# Patient Record
Sex: MALE | Race: BLACK OR AFRICAN AMERICAN | Employment: FULL TIME | URBAN - METROPOLITAN AREA
[De-identification: names, ages, dates, MRNs, and addresses within clinical notes are randomized per-mention and may not be internally consistent; named-entity substitution may affect disease eponyms.]

---

## 2019-05-24 ENCOUNTER — HOSPITAL ENCOUNTER (OUTPATIENT)
Dept: PHYSICAL THERAPY | Age: 59
Discharge: HOME | End: 2019-05-24
Payer: COMMERCIAL

## 2019-05-24 PROCEDURE — 97110 THERAPEUTIC EXERCISES: CPT

## 2019-05-24 PROCEDURE — 97162 PT EVAL MOD COMPLEX 30 MIN: CPT

## 2019-05-24 PROCEDURE — 97140 MANUAL THERAPY 1/> REGIONS: CPT

## 2019-05-24 NOTE — PROGRESS NOTES
Alexey Pace  : 1960  Primary: America Montalvo  Secondary:  [address] at [address]  [phone]   [phone]      OUTPATIENT PHYSICAL THERAPY: Daily Treatment Note 2019  Visit Count:  1    ICD-10: Treatment Diagnosis: Pain in Joint, R shoulder [M25.511]; Incomplete Rotator Cuff Tear, Not Specified as Traumatic [M75.111]; Cervicalgia [M54.2]  Precautions/Allergies:   Patient has no known allergies. TREATMENT PLAN:  Effective Dates: 2019 TO 2019 (60 days). Frequency/Duration: 2 times a week for 60 Day(s) MEDICAL/REFERRING DIAGNOSIS:  Left shoulder pain [M25.512]   DATE OF ONSET: 3/1/19  REFERRING PHYSICIAN: Jordy Pedroza MD MD Orders: Evaluate and Treat  Return MD Appointment: 19     Pre-treatment Symptoms/Complaints:  R shoulder pain  Pain: Initial: Pain Intensity 1: 8 /10 Post Session:  6/10   Medications Last Reviewed:  2019  Updated Objective Findings:  See evaluation note from today  TREATMENT:     Therapeutic Exercise: (15 Minutes):  Exercises per grid below to improve mobility and strength. Required moderate verbal and manual cues to promote proper body alignment, promote proper body posture and promote proper body mechanics. Progressed resistance, range and repetitions as indicated. Date:  2019   Activity/Exercise Parameters   ER AAROM in supine 5 minutes   AAROM shoulder flexion in supine 5 minutes   Scapular retraction 3 x 20   Upper trapezius stretch 2 minutes               Manual Therapy (    Soft Tissue Mobilization Duration  Duration: 10 Minutes): Manual techniques to facilitate improved motion and decreased pain.  (Used abbreviations: MET - muscle energy technique; PNF - proprioceptive neuromuscular facilitation; NMR - neuromuscular re-education; a/p - anterior to posterior; p/a - posterior to anterior)   · Joint mobilization: R glenohumeral, inferior glide, grade II  · Soft tissue mobilization: R upper trapezius, levator scapulae, latissimus dorsi, infraspinatus  · Manual cervical traction      [employer] Portal  Treatment/Session Summary:    · Response to Treatment:  Pt. tolerates todays treatment with mild pain complaints during soft tissue mobilization, but improve pain free shoulder ROM post treatment. Pt. is initiated on pain free AAROM exercises for the R shoulder to promote improved mobiility and ROM at this time. .  · Communication/Consultation:  None today  · Equipment provided today:  None today  · Recommendations/Intent for next treatment session: Next visit will focus on pain relief; consider pain free R shoulder ROM and gradual progression of shoulder strengthening. .    Total Treatment Billable Duration:  35  Minutes evaluation, 15 minutes therapeutic exercise, 10 minutes manual therapy.     PT Patient Time In/Time Out  Time In: 0930  Time Out: [address] Leonel Grossman PT    Future Appointments   Date Time Provider Ced Hope   5/29/2019  2:30 PM ARY PHYSICIAL THERAPIST [facility] ARY

## 2019-05-24 NOTE — THERAPY EVALUATION
Yousif Singletary  : 1960  Primary: [employer]  Secondary:  [address] at [address]  [address]  [phone]   [phone]        OUTPATIENT PHYSICAL THERAPY:Initial Assessment 2019   ICD-10: Treatment Diagnosis: Pain in Joint, R shoulder [M25.511]; Incomplete Rotator Cuff Tear, Not Specified as Traumatic [M75.111]; Cervicalgia [M54.2]  Precautions/Allergies:   Patient has no known allergies. TREATMENT PLAN:  Effective Dates: 2019 TO 2019 (60 days). Frequency/Duration: 2 times a week for 60 Day(s) MEDICAL/REFERRING DIAGNOSIS:  Left shoulder pain [M25.512]   DATE OF ONSET: 3/1/19  REFERRING PHYSICIAN: Abhinav Galo MD MD Orders: Evaluate and Treat  Return MD Appointment: 19     INITIAL ASSESSMENT:  Mr. Morales presents with signs of a R shoulder rotator cuff tear. Pt. Presents with moderate R shoulder pain, ROM deficits, weakness, and muscular guarding in the R upper trapezius, pectoralis, and latissimus dorsi musculature. PT is unable to test external rotation lag sign and drop arm test secondary to pain at initial evaluation. Secondary complaints include neck pain with likely guarding and compensation due to chronicity of current condition. Pt. Will benefit from skilled PT to address pain, normalize ROM, and improve strength for work activities and ADL's. PROBLEM LIST (Impacting functional limitations):  1. Decreased Strength  2. Decreased ADL/Functional Activities  3. Increased Pain  4. Decreased Activity Tolerance  5. Decreased Flexibility/Joint Mobility  6. Decreased [place] with Home Exercise Program INTERVENTIONS PLANNED: (Treatment may consist of any combination of the following)  1. Cryotherapy  2. Home Exercise Program (HEP)  3. Manual Therapy  4. Neuromuscular Re-education/Strengthening  5. Range of Motion (ROM)  6. Therapeutic Activites  7.  Therapeutic Exercise/Strengthening     GOALS: (Goals have been discussed and agreed upon with patient.)  Discharge Goals: Time Frame: 6 weeks  1. Pt. Will demonstrate < 4/10 R shoulder pain to improve sleep at night. 2. Pt. Will demonstrate >160 degrees of R shoulder flexion to improve overhead activities at work. 3. Pt. Will demonstrate 5/5 shoulder flexion to improve work lifting activities. 4. Pt. Will score < 20% disability on the quick Disabilities of the Shoulder and Hand (DASH) questionnaire to demonstrate improved pain and function. OUTCOME MEASURE:   Tool Used: Disabilities of the Arm, Shoulder and Hand (DASH) Questionnaire - Quick Version  Score:  Initial: 29/55  Most Recent: X/55 (Date: -- )   Interpretation of Score: The DASH is designed to measure the activities of daily living in person's with upper extremity dysfunction or pain. Each section is scored on a 1-5 scale, 5 representing the greatest disability. The scores of each section are added together for a total score of 55. MEDICAL NECESSITY:   · Patient is expected to demonstrate progress in strength and range of motion to increase independence with work activities and ADL's.  REASON FOR SERVICES/OTHER COMMENTS:  · Patient will benefit from skilled PT to address impairments identified through evaluation and return to previous ADL and recreational capacity. Total Duration:  PT Patient Time In/Time Out  Time In: 0930  Time Out: 1030    Rehabilitation Potential For Stated Goals: Good  Regarding Dimas Hamilton's therapy, I certify that the treatment plan above will be carried out by a therapist or under their direction. Thank you for this referral,  Alise Magallanes, PT     Referring Physician Signature: Camilo Devine MD _______________________________ Date _____________     PAIN/SUBJECTIVE:   Initial: Pain Intensity 1: 8 /10 Post Session:  6/10   HISTORY:   History of Injury/Illness (Reason for Referral):  Pt.  Attends PT c/o R shoulder pain since March 1st after lifting a heavy table at work. Pt. Notes the pain was initially managed with injections and he was put in a sling for 1 month to address pain. Pt. Then consulted an orthopedic physician and MRI diagnosed large rotator cuff tear in the R shoulder. Per patient, moderate glenohumeral arthritis is also present and physician recommended a total shoulder replacement. Pt. Would prefer not to have total shoulder replacement and seeks alternative intervention at this time. Currently, pt. Is not able to raise arm without pain and is not able to work. Pt. Works maintenance at [employer], climbing ladders, and carrying various objects. Secondary complaints include right neck pain. Past Medical History/Comorbidities:   Mr. Tracey Garrison  has a past medical history of Drug addiction ([address] and Rotator cuff tear. He also has no past medical history of Aneurysm ([address] Arrhythmia, Arthritis, Asthma, Autoimmune disease ([address] CAD (coronary artery disease), Cancer ([address] Chronic kidney disease, Chronic pain, Coagulation defects, COPD, Diabetes ([address] GERD (gastroesophageal reflux disease), Heart failure ([address] Hypertension, Liver disease, Psychiatric disorder, PUD (peptic ulcer disease), Seizures ([address] Stroke ([address] Thromboembolus ([address] or Thyroid disease. Mr. Tracey Garrison  has a past surgical history that includes hx knee arthroscopy and pr upper arm/elbow surgery unlisted. L rotator cuff repair 2011  Social History/Living Environment:     lives in a single story home with family  Prior Level of Function/Work/Activity:  Functioned independently without limitations     Ambulatory/Rehab Services H2 Model Falls Risk Assessment   Risk Factors:       (1)  Gender [Male] Ability to Rise from Chair:       (0)  Ability to rise in a single movement   Falls Prevention Plan:       No modifications necessary   Total: (5 or greater = High Risk): 1   ©2010 [employer]. All Rights Reserved. [facility] Patent #[id number]. Federal Law prohibits the replication, distribution or use without written permission from [facility] Vitals (vitals.com) [facility]   Current Medications:     No current outpatient medications on file. Date Last Reviewed:  5/24/2019   Number of Personal Factors/Comorbidities that affect the Plan of Care: 0: LOW COMPLEXITY   EXAMINATION:    ROM:       RIGHT LEFT    Shoulder flexion  60 AROM (pain)  162    Shoulder external rotation  60 PROM (pain)  79    Shoulder internal rotation  15 PROM (pain)  35          Strength:       RIGHT  LEFT     Shoulder flexion  2+/5 (pain)  5/5    Shoulder ER  3+/5 (pain) 5/5     Shoulder IR 5/5 (pain) 5/5    Scapular retraction  5/5 5/5           Flexibility:       RIGHT LEFT    Pectoralis Major/Minor  limited limited     Latissimus Dorsi  -- --     Upper trapezius  --  --          Special Testing       RIGHT LEFT    IR lag sign (+)       ER lag sign Unable to test       Drop arm Unable to test       Spurlings (-) pain in shoulder blade   (-)          Joint Mobility:        RIGHT LEFT       --  --                    Body Structures Involved:  1. Joints  2. Muscles Body Functions Affected:  1. Sensory/Pain  2. Neuromusculoskeletal  3. Movement Related Activities and Participation Affected:  1. General Tasks and Demands  2. Mobility  3. Self Care  4.  Community, Social and [place]   Number of elements (examined above) that affect the Plan of Care: 3: MODERATE COMPLEXITY   CLINICAL PRESENTATION:   Presentation: Evolving clinical presentation with changing clinical characteristics: MODERATE COMPLEXITY   CLINICAL DECISION MAKING:   Use of outcome tool(s) and clinical judgement create a POC that gives a: Questionable prediction of patient's progress: MODERATE COMPLEXITY

## 2019-05-29 ENCOUNTER — HOSPITAL ENCOUNTER (OUTPATIENT)
Dept: PHYSICAL THERAPY | Age: 59
Discharge: HOME | End: 2019-05-29
Payer: COMMERCIAL

## 2019-05-29 PROCEDURE — 97014 ELECTRIC STIMULATION THERAPY: CPT

## 2019-05-29 PROCEDURE — 97140 MANUAL THERAPY 1/> REGIONS: CPT

## 2019-05-29 NOTE — PROGRESS NOTES
Alexey Pace  : 1960  Primary: [employer]  Secondary:  [address] at [address], [address]  [phone]   [phone]      OUTPATIENT PHYSICAL THERAPY: Daily Treatment Note 2019  Visit Count:  2    ICD-10: Treatment Diagnosis: Pain in Joint, R shoulder [M25.511]; Incomplete Rotator Cuff Tear, Not Specified as Traumatic [M75.111]; Cervicalgia [M54.2]  Precautions/Allergies:   Patient has no known allergies. TREATMENT PLAN:  Effective Dates: 2019 TO 2019 (60 days). Frequency/Duration: 2 times a week for 60 Day(s) MEDICAL/REFERRING DIAGNOSIS:  Left shoulder pain [M25.512]   DATE OF ONSET: 3/1/19  REFERRING PHYSICIAN: Jordy Pedroza MD MD Orders: Evaluate and Treat  Return MD Appointment: 19     Pre-treatment Symptoms/Complaints:  R shoulder pain  Pain: Initial:  8/10 Post Session:  8/10   Medications Last Reviewed:  2019  Updated Objective Findings:  See evaluation note from today  TREATMENT:     Therapeutic Exercise: ( 0 minutes):  Exercises per grid below to improve mobility and strength. Required moderate verbal and manual cues to promote proper body alignment, promote proper body posture and promote proper body mechanics. Progressed resistance, range and repetitions as indicated. Date:  19   Activity/Exercise Parameters   ER AAROM in supine 5 minutes   AAROM shoulder flexion in supine 5 minutes   Scapular retraction 3 x 20   Upper trapezius stretch 2 minutes               Manual Therapy (   30 minutes  ): Manual techniques to facilitate improved motion and decreased pain.  (Used abbreviations: MET - muscle energy technique; PNF - proprioceptive neuromuscular facilitation; NMR - neuromuscular re-education; a/p - anterior to posterior; p/a - posterior to anterior)   · Joint mobilization: R glenohumeral, inferior glide, grade II  · Soft tissue mobilization: R upper trapezius, levator scapulae, latissimus dorsi, infraspinatus  · Manual cervical traction    MODALITIES: (12 minutes) Patient seated  with arms supported with wedge and pillows for comfort for IFES at level 14 to right upper trap and shoulder with ice pack all to help decrease inflammation and pain. [employer] Portal  Treatment/Session Summary:    · Response to Treatment:  Patient came into session today with reported increased pain than previous 2-3 days. Patient extremely guarded with attempted R shoulder PROM. Palpable tightness noted in R upper trap, levator scapulae. · Communication/Consultation:  None today  · Equipment provided today:  None today  · Recommendations/Intent for next treatment session: Next visit will focus on pain relief; consider pain free R shoulder ROM and gradual progression of shoulder strengthening. .    Total Treatment Billable Duration:  42 minutes  PT Patient Time In/Time Out  Time In: 1430  Time Out: [address] [facility]    No future appointments.

## 2019-06-03 ENCOUNTER — HOSPITAL ENCOUNTER (OUTPATIENT)
Dept: PHYSICAL THERAPY | Age: 59
Discharge: HOME | End: 2019-06-03
Payer: COMMERCIAL

## 2019-06-03 PROCEDURE — 97140 MANUAL THERAPY 1/> REGIONS: CPT

## 2019-06-03 PROCEDURE — 97014 ELECTRIC STIMULATION THERAPY: CPT

## 2019-06-03 NOTE — PROGRESS NOTES
Giovanni Forrester  : 1960  Primary: [employer]  Secondary:  [address] at [address]  [phone]   [phone]      OUTPATIENT PHYSICAL THERAPY: Daily Treatment Note 6/3/2019  Visit Count:  3    ICD-10: Treatment Diagnosis: Pain in Joint, R shoulder [M25.511]; Incomplete Rotator Cuff Tear, Not Specified as Traumatic [M75.111]; Cervicalgia [M54.2]  Precautions/Allergies:   Patient has no known allergies. TREATMENT PLAN:  Effective Dates: 2019 TO 2019 (60 days). Frequency/Duration: 2 times a week for 60 Day(s) MEDICAL/REFERRING DIAGNOSIS:  Left shoulder pain [M25.512]   DATE OF ONSET: 3/1/19  REFERRING PHYSICIAN: Birtha Schilder, MD MD Orders: Evaluate and Treat  Return MD Appointment: 19     Pre-treatment Symptoms/Complaints: Patient reports increased pain in R shoulder the past few days. Patient able to sleep for only 2 hours last night due to pain in R shoulder. Patient in guarded posture with mobility. Pain: Initial:  8/10 Post Session:  7/10   Medications Last Reviewed:  6/3/2019  Updated Objective Findings:  See evaluation note from today  TREATMENT:     Therapeutic Exercise: ( 0 minutes):  Exercises per grid below to improve mobility and strength. Required moderate verbal and manual cues to promote proper body alignment, promote proper body posture and promote proper body mechanics. Progressed resistance, range and repetitions as indicated. Date:  19   Activity/Exercise Parameters   ER AAROM in supine 5 minutes   AAROM shoulder flexion in supine 5 minutes   Scapular retraction 3 x 20   Upper trapezius stretch 2 minutes               Manual Therapy (    30 minutes  ): Manual techniques to facilitate improved motion and decreased pain.  (Used abbreviations: MET - muscle energy technique; PNF - proprioceptive neuromuscular facilitation; NMR - neuromuscular re-education; a/p - anterior to posterior; p/a - posterior to anterior)   · Joint mobilization: R glenohumeral, inferior glide, grade II  · Soft tissue mobilization: R upper trapezius, levator scapulae, latissimus dorsi, infraspinatus, pec minor  · Manual cervical traction    MODALITIES: (10 minutes) Patient seated  with arms supported with wedge and pillows for comfort for IFES at level 18 to right upper trap and shoulder with ice pack all to help decrease inflammation and pain. [facility] Portal  Treatment/Session Summary:    · Response to Treatment:  Decreased palpable muscular tightness following manual therapy. Patient reported mild decrease in pain. Therapeutic exercises held this date due to pain. Will re-introduce when pain decreases. · Communication/Consultation:  None today  · Equipment provided today:  None today  · Recommendations/Intent for next treatment session: Next visit will focus on pain relief; consider pain free R shoulder ROM and gradual progression of shoulder strengthening. .    Total Treatment Billable Duration:  40 minutes  PT Patient Time In/Time Out  Time In: 1300  Time Out: [address]    No future appointments.

## 2019-06-06 ENCOUNTER — APPOINTMENT (OUTPATIENT)
Dept: PHYSICAL THERAPY | Age: 59
End: 2019-06-06

## 2019-06-10 ENCOUNTER — HOSPITAL ENCOUNTER (OUTPATIENT)
Dept: PHYSICAL THERAPY | Age: 59
Discharge: HOME | End: 2019-06-10
Payer: COMMERCIAL

## 2019-06-10 PROCEDURE — 97140 MANUAL THERAPY 1/> REGIONS: CPT

## 2019-06-10 PROCEDURE — 97014 ELECTRIC STIMULATION THERAPY: CPT

## 2019-06-10 PROCEDURE — 97110 THERAPEUTIC EXERCISES: CPT

## 2019-06-10 NOTE — PROGRESS NOTES
Elizabeth Ramos  : 1960  Primary: [employer]  Secondary:  [address] at [address], [address] [place] [address]  [phone]   [phone]      OUTPATIENT PHYSICAL THERAPY: Daily Treatment Note 6/10/2019  Visit Count:  4    ICD-10: Treatment Diagnosis: Pain in Joint, R shoulder [M25.511]; Incomplete Rotator Cuff Tear, Not Specified as Traumatic [M75.111]; Cervicalgia [M54.2]  Precautions/Allergies:   Patient has no known allergies. TREATMENT PLAN:  Effective Dates: 2019 TO 2019 (60 days). Frequency/Duration: 2 times a week for 60 Day(s) MEDICAL/REFERRING DIAGNOSIS:  Left shoulder pain [M25.512]   DATE OF ONSET: 3/1/19  REFERRING PHYSICIAN: Dinora Carlton MD MD Orders: Evaluate and Treat  Return MD Appointment: 19     Pre-treatment Symptoms/Complaints: Patient reports he got injection in R shoulder early last week and pain subsided from 9/10 to 7/10 for 2-3 days. Pain has returned to 9/10 at rest.  Pain: Initial:  9/10 Post Session:  8/10   Medications Last Reviewed:  6/10/2019  Updated Objective Findings:  See evaluation note from today  TREATMENT:     Therapeutic Exercise: ( 10 minutes):  Exercises per grid below to improve mobility and strength. Required moderate verbal and manual cues to promote proper body alignment, promote proper body posture and promote proper body mechanics. Progressed resistance, range and repetitions as indicated.    Date:  19 Date:  6/10/19   Activity/Exercise Parameters Parameters   ER AAROM in supine 5 minutes    AAROM shoulder flexion in supine 5 minutes    Scapular retraction 3 x 20    Upper trapezius stretch 2 minutes    Isometric Abd R shoulder  1 x 10   Isometric Flex R shoulder  1 x 10   Isometric ER R shoulder  1 x 10   Isometric IR R shoulder  1 x 10   Isometric Ext R shoulder  1 x 10             Manual Therapy (    25 minutes  ): Manual techniques to facilitate improved motion and decreased pain. (Used abbreviations: MET - muscle energy technique; PNF - proprioceptive neuromuscular facilitation; NMR - neuromuscular re-education; a/p - anterior to posterior; p/a - posterior to anterior)   · Joint mobilization: R glenohumeral, inferior glide, grade II  · Soft tissue mobilization: R upper trapezius, levator scapulae, latissimus dorsi, infraspinatus, pec minor  · Manual cervical traction    MODALITIES: (10 minutes) Patient seated  with arms supported with wedge and pillows for comfort for IFES at level 18 to right upper trap and shoulder with ice pack all to help decrease inflammation and pain. [employer] Portal  Treatment/Session Summary:    · Response to Treatment:  Patient reported decreased pain in R shoulder following therapy session. Provided HEP on R shoulder isometrics. · Communication/Consultation:  None today  · Equipment provided today:  None today  · Recommendations/Intent for next treatment session: Next visit will focus on pain relief; consider pain free R shoulder ROM and gradual progression of shoulder strengthening.  .    Total Treatment Billable Duration:  45 minutes  PT Patient Time In/Time Out  Time In: 1325  Time Out: [facility]    Future Appointments   Date Time Provider Ced Hope   6/17/2019  1:00 PM ARY PHYSICIAL THERAPIST JEAN MCALLISTER   6/19/2019  1:00 PM SFYUNIOR PHYSICIAL THERAPIST JEAN MCALLISTER

## 2019-06-17 ENCOUNTER — HOSPITAL ENCOUNTER (OUTPATIENT)
Dept: PHYSICAL THERAPY | Age: 59
Discharge: HOME | End: 2019-06-17
Payer: COMMERCIAL

## 2019-06-17 PROCEDURE — 97140 MANUAL THERAPY 1/> REGIONS: CPT

## 2019-06-17 PROCEDURE — 97110 THERAPEUTIC EXERCISES: CPT

## 2019-06-17 NOTE — PROGRESS NOTES
Kay Josef  : 1960  Primary: [employer]vira [employer]  Secondary:  [address] at [address]  [phone]   [phone]      OUTPATIENT PHYSICAL THERAPY: Daily Treatment Note 2019  Visit Count:  5    ICD-10: Treatment Diagnosis: Pain in Joint, R shoulder [M25.511]; Incomplete Rotator Cuff Tear, Not Specified as Traumatic [M75.111]; Cervicalgia [M54.2]  Precautions/Allergies:   Patient has no known allergies. TREATMENT PLAN:  Effective Dates: 2019 TO 2019 (60 days). Frequency/Duration: 2 times a week for 60 Day(s) MEDICAL/REFERRING DIAGNOSIS:  Left shoulder pain [M25.512]   DATE OF ONSET: 3/1/19  REFERRING PHYSICIAN: Carrie Gonzalez MD MD Orders: Evaluate and Treat  Return MD Appointment: 19     Pre-treatment Symptoms/Complaints:Patient reports he is noticing improved AROM of R shoulder. Pain only present at current end range of motion. Pain: Initial:  10 Post Session:  5/10   Medications Last Reviewed:  2019  Updated Objective Findings:  See evaluation note from today  TREATMENT:     Therapeutic Exercise: (  10 minutes):  Exercises per grid below to improve mobility and strength. Required moderate verbal and manual cues to promote proper body alignment, promote proper body posture and promote proper body mechanics. Progressed resistance, range and repetitions as indicated.    Date:  19 Date:  6/10/19 Date:  19   Activity/Exercise Parameters Parameters Parameters   ER AAROM in supine 5 minutes     AAROM shoulder flexion in supine 5 minutes     Scapular retraction 3 x 20     Upper trapezius stretch 2 minutes     Isometric Abd R shoulder  1 x 10 2 x 10   Isometric Flex R shoulder  1 x 10 2 x 10   Isometric ER R shoulder  1 x 10 2 x 10   Isometric IR R shoulder  1 x 10 2 x 10   Isometric Ext R shoulder  1 x 10 2 x 10   AROM R shoulder flexion   1 x 10   AROM R shoulder abduction   1 x 10 Manual Therapy (    15 minutes  ): Manual techniques to facilitate improved motion and decreased pain. (Used abbreviations: MET - muscle energy technique; PNF - proprioceptive neuromuscular facilitation; NMR - neuromuscular re-education; a/p - anterior to posterior; p/a - posterior to anterior)   · Joint mobilization: R glenohumeral, inferior glide, grade II  · Soft tissue mobilization: R upper trapezius, levator scapulae, latissimus dorsi, infraspinatus, pec minor  · Manual cervical traction    MODALITIES: (0 minutes) Patient seated  with arms supported with wedge and pillows for comfort for IFES at level 18 to right upper trap and shoulder with ice pack all to help decrease inflammation and pain. [employer] Portal  Treatment/Session Summary:    · Response to Treatment:  Patient demonstrated improved tolerance to manual therapy and PROM. Patient demonstrated improved AROM with exercise, good adherence to HEP. · Communication/Consultation:  None today  · Equipment provided today:  None today  · Recommendations/Intent for next treatment session: Next visit will focus on pain relief; consider pain free R shoulder ROM and gradual progression of shoulder strengthening.  .    Total Treatment Billable Duration:  25 minutes  PT Patient Time In/Time Out  Time In: 1300  Time Out: [facility]    Future Appointments   Date Time Provider Ced Hope   6/19/2019  1:00 PM ARY PHYSICIAL THERAPIST JEAN MCALLISTER

## 2019-06-19 ENCOUNTER — HOSPITAL ENCOUNTER (OUTPATIENT)
Dept: PHYSICAL THERAPY | Age: 59
Discharge: HOME | End: 2019-06-19
Payer: COMMERCIAL

## 2019-06-19 PROCEDURE — 97140 MANUAL THERAPY 1/> REGIONS: CPT

## 2019-06-19 NOTE — PROGRESS NOTES
Eric Spencer  : 1960  Primary: Mariana [employer]  Secondary:  [address] at [address]  [phone]   [phone]      OUTPATIENT PHYSICAL THERAPY: Daily Treatment Note 2019  Visit Count:  6    ICD-10: Treatment Diagnosis: Pain in Joint, R shoulder [M25.511]; Incomplete Rotator Cuff Tear, Not Specified as Traumatic [M75.111]; Cervicalgia [M54.2]  Precautions/Allergies:   Patient has no known allergies. TREATMENT PLAN:  Effective Dates: 2019 TO 2019 (60 days). Frequency/Duration: 2 times a week for 60 Day(s) MEDICAL/REFERRING DIAGNOSIS:  Left shoulder pain [M25.512]   DATE OF ONSET: 3/1/19  REFERRING PHYSICIAN: Davion Roblero MD MD Orders: Evaluate and Treat  Return MD Appointment: 19     Pre-treatment Symptoms/Complaints:Patient reports increased pain in R shoulder today. Pain: Initial:  6/10 Post Session:  5/10   Medications Last Reviewed:  2019  Updated Objective Findings:  None Today  TREATMENT:     Therapeutic Exercise: (  0 minutes):  Exercises per grid below to improve mobility and strength. Required moderate verbal and manual cues to promote proper body alignment, promote proper body posture and promote proper body mechanics. Progressed resistance, range and repetitions as indicated.    Date:  19 Date:  6/10/19 Date:  19 Date:  19   Activity/Exercise Parameters Parameters Parameters Parameters   ER AAROM in supine 5 minutes      AAROM shoulder flexion in supine 5 minutes      Scapular retraction 3 x 20      Upper trapezius stretch 2 minutes      Isometric Abd R shoulder  1 x 10 2 x 10    Isometric Flex R shoulder  1 x 10 2 x 10    Isometric ER R shoulder  1 x 10 2 x 10    Isometric IR R shoulder  1 x 10 2 x 10    Isometric Ext R shoulder  1 x 10 2 x 10    AROM R shoulder flexion   1 x 10    AROM R shoulder abduction   1 x 10    Manual Therapy (    25 minutes  ): Manual techniques to facilitate improved motion and decreased pain. (Used abbreviations: MET - muscle energy technique; PNF - proprioceptive neuromuscular facilitation; NMR - neuromuscular re-education; a/p - anterior to posterior; p/a - posterior to anterior)   · Joint mobilization: R glenohumeral, inferior glide, grade II  · Soft tissue mobilization: R upper trapezius, levator scapulae, latissimus dorsi, infraspinatus, pec minor  · Manual cervical traction    MODALITIES: (0 minutes) Patient seated  with arms supported with wedge and pillows for comfort for IFES at level 18 to right upper trap and shoulder with ice pack all to help decrease inflammation and pain. [place] Portal  Treatment/Session Summary:    · Response to Treatment:  Decreased palpable muscle guarding/tightness in R shoulder and cervical musculature following manual therapy. · Communication/Consultation:  None today  · Equipment provided today:  None today  · Recommendations/Intent for next treatment session: Next visit will focus on pain relief; consider pain free R shoulder ROM and gradual progression of shoulder strengthening.  .    Total Treatment Billable Duration:  25 minutes   Time in: 1300  Time out: [address] [place] [address]    Future Appointments   Date Time Provider Ced Hope   6/24/2019  1:00 PM [facility] PHYSICIAL THERAPIST JEAN MCALLISTER   6/26/2019  1:00 PM [facility] PHYSICIAL THERAPIST JEAN MCALLISTER   7/1/2019  1:00 PM [facility] PHYSICIAL THERAPIST JEAN MCALLISTER   7/3/2019  1:00 PM [facility] PHYSICIAL THERAPIST JEAN MCALLISTER   7/8/2019  1:00 PM [facility] PHYSICIAL THERAPIST JUAN CRPDONG MCALLISTER   7/10/2019  1:00 PM [facility] PHYSICIAL THERAPIST JUAN CRPDONG MCALLISTER

## 2019-06-24 ENCOUNTER — HOSPITAL ENCOUNTER (OUTPATIENT)
Dept: PHYSICAL THERAPY | Age: 59
Discharge: HOME | End: 2019-06-24
Payer: COMMERCIAL

## 2019-06-24 PROCEDURE — 97140 MANUAL THERAPY 1/> REGIONS: CPT

## 2019-06-24 PROCEDURE — 97110 THERAPEUTIC EXERCISES: CPT

## 2019-06-24 NOTE — PROGRESS NOTES
Radha Salvador  : 1960  Primary: [employer]  Secondary:  [address] at [address]  [phone]   [phone]      OUTPATIENT PHYSICAL THERAPY: Daily Treatment Note 2019  Visit Count:  7    ICD-10: Treatment Diagnosis: Pain in Joint, R shoulder [M25.511]; Incomplete Rotator Cuff Tear, Not Specified as Traumatic [M75.111]; Cervicalgia [M54.2]  Precautions/Allergies:   Patient has no known allergies. TREATMENT PLAN:  Effective Dates: 2019 TO 2019 (60 days). Frequency/Duration: 2 times a week for 60 Day(s) MEDICAL/REFERRING DIAGNOSIS:  Left shoulder pain [M25.512]   DATE OF ONSET: 3/1/19  REFERRING PHYSICIAN: Saravanan Gillespie MD MD Orders: Evaluate and Treat  Return MD Appointment: 19     Pre-treatment Symptoms/Complaints:Patient reports R shoulder has been painful this morning. Reports performing HEP over the weekend, notices improved AROM. Pain: Initial:  6/10 Post Session:  5/10   Medications Last Reviewed:  2019  Updated Objective Findings:  None Today  TREATMENT:     Therapeutic Exercise: (  30 minutes):  Exercises per grid below to improve mobility and strength. Required moderate verbal and manual cues to promote proper body alignment, promote proper body posture and promote proper body mechanics. Progressed resistance, range and repetitions as indicated.    Date:  19 Date:  6/10/19 Date:  19 Date:  19   Activity/Exercise Parameters Parameters Parameters Parameters   ER AAROM in supine 5 minutes      AAROM shoulder flexion in supine 5 minutes      Scapular retraction 3 x 20      Upper trapezius stretch 2 minutes      Isometric Abd R shoulder  1 x 10 2 x 10    Isometric Flex R shoulder  1 x 10 2 x 10    Isometric ER R shoulder  1 x 10 2 x 10    Isometric IR R shoulder  1 x 10 2 x 10    Isometric Ext R shoulder  1 x 10 2 x 10    AROM R shoulder flexion   1 x 10    AROM R shoulder abduction   1 x 10    UBE    3 min forward/backward Level 2   Lat Pull down    2 x 10 green tB   R shoulder IR    2 x 10 green tB   R shoulder ER    2 x 10 green tB   Rows    2 x 10 green tB   Manual Therapy (    10 minutes  ): Manual techniques to facilitate improved motion and decreased pain. (Used abbreviations: MET - muscle energy technique; PNF - proprioceptive neuromuscular facilitation; NMR - neuromuscular re-education; a/p - anterior to posterior; p/a - posterior to anterior)   · Joint mobilization: R glenohumeral, inferior glide, grade II  · Soft tissue mobilization: R upper trapezius, levator scapulae, latissimus dorsi, infraspinatus, pec minor  · Manual cervical traction    MODALITIES: (0 minutes) Patient seated  with arms supported with wedge and pillows for comfort for IFES at level 18 to right upper trap and shoulder with ice pack all to help decrease inflammation and pain. [employer] Portal  Treatment/Session Summary:    · Response to Treatment: Patient required verbal and tactile cues for body mechanics with therapeutic exercise. Patient fatigued in R shoulder with therapeutic exercise. · Communication/Consultation:  None today  · Equipment provided today:  None today  · Recommendations/Intent for next treatment session: Next visit will focus on pain relief; consider pain free R shoulder ROM and gradual progression of shoulder strengthening.  .    Total Treatment Billable Duration:  40 minutes  PT Patient Time In/Time Out  Time In: 1300  [facility]    Future Appointments   Date Time Provider Ced Hope   6/26/2019  1:00 PM [facility] PHYSICIAL THERAPIST JEAN MCALLISTER   7/1/2019  1:00 PM [facility] PHYSICIAL THERAPIST JEAN MCALLISTER   7/3/2019  1:00 PM [facility] PHYSICIAL THERAPIST JEAN MCALLISTER   7/8/2019  1:00 PM [facility] PHYSICIAL THERAPIST JUAN CRPDONG MCALLISTER   7/10/2019  1:00 PM SFYUNIOR PHYSICIAL THERAPIST JUAN CRPDONG MCALLISTER

## 2019-06-26 ENCOUNTER — HOSPITAL ENCOUNTER (OUTPATIENT)
Dept: PHYSICAL THERAPY | Age: 59
Discharge: HOME | End: 2019-06-26
Payer: COMMERCIAL

## 2019-06-26 PROCEDURE — 97140 MANUAL THERAPY 1/> REGIONS: CPT

## 2019-06-26 PROCEDURE — 97110 THERAPEUTIC EXERCISES: CPT

## 2019-06-26 NOTE — THERAPY EVALUATION
Rakesh London  : 1960  Primary: Mahi Montalvo  Secondary:  [address]  at [facility], [address], [place], [address] [facility]  [phone]   [phone]       OUTPATIENT PHYSICAL THERAPY:Progress Report 2019   ICD-10: Treatment Diagnosis: Pain in Joint, R shoulder [M25.511]; Incomplete Rotator Cuff Tear, Not Specified as Traumatic [M75.111]; Cervicalgia [M54.2]  Precautions/Allergies:   Patient has no known allergies. TREATMENT PLAN:  Effective Dates: 2019 TO 2019 (60 days). Frequency/Duration: 2 times a week for 60 Day(s) MEDICAL/REFERRING DIAGNOSIS:  Left shoulder pain [M25.512]   DATE OF ONSET: 3/1/19  REFERRING PHYSICIAN: Leisa Abbasi MD MD Orders: Evaluate and Treat  Return MD Appointment: TBD by patient     Progress Report:  Mr. Morales continues to present with weakness and pain in R shoulder following workplace accident in early March. Patient presents with decreased AROM of R shoulder as well as painful limitations in PROM of R shoulder. Patient with decreased strength in R shoulder showing signs of possible R shoulder rotator cuff tear. Patient continues to present with muscle guarding of R upper trapezius, pectoralis, and cervical musculature. Pt. Will benefit from continued skilled therapy to address pain, normalize ROM and improve strength for work activities and ADL's. PROBLEM LIST (Impacting functional limitations):  1. Decreased Strength  2. Decreased ADL/Functional Activities  3. Increased Pain  4. Decreased Activity Tolerance  5. Decreased Flexibility/Joint Mobility  6. Decreased [place] with Home Exercise Program INTERVENTIONS PLANNED: (Treatment may consist of any combination of the following)  1. Cryotherapy  2. Home Exercise Program (HEP)  3. Manual Therapy  4. Neuromuscular Re-education/Strengthening  5. Range of Motion (ROM)  6. Therapeutic Activites  7.  Therapeutic Exercise/Strengthening     GOALS: (Goals have been discussed and agreed upon with patient.)  Discharge Goals: Time Frame: 6 weeks  1. Pt. Will demonstrate < 4/10 R shoulder pain to improve sleep at night. NOT MET  2. Pt. Will demonstrate >160 degrees of R shoulder flexion to improve overhead activities at work. NOT MET  3. Pt. Will demonstrate 5/5 shoulder flexion to improve work lifting activities. NOT MET  4. Pt. Will score < 20% disability on the quick Disabilities of the Shoulder and Hand (DASH) questionnaire to demonstrate improved pain and function. NOT MET    OUTCOME MEASURE:   Tool Used: Disabilities of the Arm, Shoulder and Hand (DASH) Questionnaire - Quick Version  Score:  Initial: 29/55  Most Recent: 41/55 (Date: -- )   Interpretation of Score: The DASH is designed to measure the activities of daily living in person's with upper extremity dysfunction or pain. Each section is scored on a 1-5 scale, 5 representing the greatest disability. The scores of each section are added together for a total score of 55. MEDICAL NECESSITY:   · Patient is expected to demonstrate progress in strength and range of motion to increase independence with work activities and ADL's.  REASON FOR SERVICES/OTHER COMMENTS:  · Patient will benefit from skilled PT to address impairments identified through evaluation and return to previous ADL and recreational capacity. Rehabilitation Potential For Stated Goals: Good  Regarding Mady Hamilton's therapy, I certify that the treatment plan above will be carried out by a therapist or under their direction. Thank you for this referral,  Catracho Jacobs     Referring Physician Signature: Gloria Mcfarlane MD _______________________________ Date _____________     PAIN/SUBJECTIVE:   Initial:   6/10 Post Session:  6/10   HISTORY:   History of Injury/Illness (Reason for Referral):  Pt. Attends PT c/o R shoulder pain since March 1st after lifting a heavy table at work.   Pt. Notes the pain was initially managed with injections and he was put in a sling for 1 month to address pain. Pt. Then consulted an orthopedic physician and MRI diagnosed large rotator cuff tear in the R shoulder. Per patient, moderate glenohumeral arthritis is also present and physician recommended a total shoulder replacement. Pt. Would prefer not to have total shoulder replacement and seeks alternative intervention at this time. Currently, pt. Is not able to raise arm without pain and is not able to work. Pt. Works maintenance at [employer], climbing ladders, and carrying various objects. Secondary complaints include right neck pain. Past Medical History/Comorbidities:   Mr. Morales  has a past medical history of Drug addiction ([address] and Rotator cuff tear. He also has no past medical history of Aneurysm ([address] Arrhythmia, Arthritis, Asthma, Autoimmune disease ([address] CAD (coronary artery disease), Cancer ([address] Chronic kidney disease, Chronic pain, Coagulation defects, COPD, Diabetes ([address] GERD (gastroesophageal reflux disease), Heart failure ([address] Hypertension, Liver disease, Psychiatric disorder, PUD (peptic ulcer disease), Seizures ([address] Stroke ([address] Thromboembolus ([address] or Thyroid disease. Mr. Morales  has a past surgical history that includes hx knee arthroscopy and pr upper arm/elbow surgery unlisted. L rotator cuff repair 2011  Social History/Living Environment:     lives in a single story home with family  Prior Level of Function/Work/Activity:  Functioned independently without limitations     Ambulatory/Rehab Services H2 Model Falls Risk Assessment   Risk Factors:       (1)  Gender [Male] Ability to Rise from Chair:       (0)  Ability to rise in a single movement   Falls Prevention Plan:       No modifications necessary   Total: (5 or greater = High Risk): 1   ©2010 [facility] [employer]. All Rights Reserved. [facility] Patent #[id number].  Federal Law prohibits the replication, distribution or use without written permission from [facility] of [address] [place] [address]   Current Medications:     No current outpatient medications on file. Date Last Reviewed:  6/26/2019   Number of Personal Factors/Comorbidities that affect the Plan of Care: 0: LOW COMPLEXITY   EXAMINATION:    ROM:   Re-assess date:  6/26/19      RIGHT LEFT Right     Shoulder flexion  60 AROM (pain)  162 153 AROM     Shoulder external rotation  60 PROM (pain)  79 38 AROM     Shoulder internal rotation  15 PROM (pain)  35 35 AROM             Strength:         RIGHT  LEFT  Right     Shoulder flexion  2+/5 (pain)  5/5 3+/5     Shoulder ER  3+/5 (pain) 5/5  3+/5     Shoulder IR 5/5 (pain) 5/5 5/5     Scapular retraction  5/5 5/5  5/5             Flexibility:         RIGHT LEFT Right Left    Pectoralis Major/Minor  limited limited  limited limited    Latissimus Dorsi  -- --       Upper trapezius  --  --              Special Testing         RIGHT LEFT      IR lag sign (+)         ER lag sign Unable to test         Drop arm Unable to test         Spurlings (-) pain in shoulder blade   (-)              Joint Mobility:          RIGHT LEFT         --  --                          Body Structures Involved:  1. Joints  2. Muscles Body Functions Affected:  1. Sensory/Pain  2. Neuromusculoskeletal  3. Movement Related Activities and Participation Affected:  1. General Tasks and Demands  2. Mobility  3. Self Care  4.  Community, Social and [place]   Number of elements (examined above) that affect the Plan of Care: 3: MODERATE COMPLEXITY   CLINICAL PRESENTATION:   Presentation: Evolving clinical presentation with changing clinical characteristics: MODERATE COMPLEXITY   CLINICAL DECISION MAKING:   Use of outcome tool(s) and clinical judgement create a POC that gives a: Questionable prediction of patient's progress: MODERATE COMPLEXITY     TREATMENT:      Therapeutic Exercise: (  10 minutes):  Exercises per grid below to improve mobility and strength.  Required moderate verbal and manual cues to promote proper body alignment, promote proper body posture and promote proper body mechanics.  Progressed resistance, range and repetitions as indicated.    Date:  5/24/19 Date:  6/10/19 Date:  6/17/19 Date:  6/24/19 Date:  6/26/19   Activity/Exercise Parameters Parameters Parameters Parameters Parameters   ER AAROM in supine 5 minutes          AAROM shoulder flexion in supine 5 minutes          Scapular retraction 3 x 20          Upper trapezius stretch 2 minutes          Isometric Abd R shoulder   1 x 10 2 x 10      Isometric Flex R shoulder   1 x 10 2 x 10      Isometric ER R shoulder   1 x 10 2 x 10      Isometric IR R shoulder   1 x 10 2 x 10      Isometric Ext R shoulder   1 x 10 2 x 10      AROM R shoulder flexion     1 x 10      AROM R shoulder abduction     1 x 10      UBE       3 min forward/backward Level 2    Lat Pull down       2 x 10 green tB 2 x 10 green tB   R shoulder IR       2 x 10 green tB 2 x 10 green tB   R shoulder ER       2 x 10 green tB 2 x 10 green tB   Rows       2 x 10 green tB 2 x 10 green tB   Manual Therapy (    20 minutes  ): Manual techniques to facilitate improved motion and decreased pain. (Used abbreviations: MET - muscle energy technique; PNF - proprioceptive neuromuscular facilitation; NMR - neuromuscular re-education; a/p - anterior to posterior; p/a - posterior to anterior)   · Joint mobilization: R glenohumeral, inferior glide, grade II  · Soft tissue mobilization: R upper trapezius, levator scapulae, latissimus dorsi, infraspinatus, pec minor  · Manual cervical traction    Treatment/Session Summary:    · Response to Treatment: Patient required verbal and tactile cues for body mechanics with therapeutic exercise. Patient fatigued in R shoulder with therapeutic exercise.   · Communication/Consultation:  None today  · Equipment provided today:  None today  · Recommendations/Intent for next treatment session: Next visit will focus on pain relief; consider pain free R shoulder ROM and gradual progression of shoulder strengthening.  .     Total Treatment Billable Duration:  30 minutes  PT Patient Time In/Time Out  Time in: 1300  Time out: [address]   Date Time Provider Ced Hope   6/26/2019  1:00 PM [facility] PHYSICIAL THERAPIST [facility]   7/1/2019  1:00 PM [facility] PHYSICIAL THERAPIST [facility] D   7/3/2019  1:00 PM [facility] PHYSICIAL THERAPIST [facility] D   7/8/2019  1:00 PM [facility] PHYSICIAL THERAPIST [facility] D   7/10/2019  1:00 PM [facility] PHYSICIAL THERAPIST [facility]

## 2019-07-01 ENCOUNTER — HOSPITAL ENCOUNTER (OUTPATIENT)
Dept: PHYSICAL THERAPY | Age: 59
Discharge: HOME | End: 2019-07-01
Payer: COMMERCIAL

## 2019-07-01 PROCEDURE — 97110 THERAPEUTIC EXERCISES: CPT

## 2019-07-01 NOTE — PROGRESS NOTES
Jeniffer Oconnor  : 1960  Primary: Jason Hu  Secondary:  [address] at [address]  [phone]   [phone]      OUTPATIENT PHYSICAL THERAPY: Daily Treatment Note 2019  Visit Count:  9    ICD-10: Treatment Diagnosis: Pain in Joint, R shoulder [M25.511]; Incomplete Rotator Cuff Tear, Not Specified as Traumatic [M75.111]; Cervicalgia [M54.2]  Precautions/Allergies:   Patient has no known allergies. TREATMENT PLAN:  Effective Dates: 2019 TO 2019 (60 days). Frequency/Duration: 2 times a week for 60 Day(s) MEDICAL/REFERRING DIAGNOSIS:  Left shoulder pain [M25.512]   DATE OF ONSET: 3/1/19  REFERRING PHYSICIAN: Rosa Esquivel MD MD Orders: Evaluate and Treat  Return MD Appointment: 19     Pre-treatment Symptoms/Complaints:Patient reports continued pain in R shoulder with all activity. Feels he cannot get any relief. Motivated to get stronger and return to work. Pain: Initial:  6/10 Post Session:  5/10   Medications Last Reviewed:  2019  Updated Objective Findings:  None Today  TREATMENT:     Therapeutic Exercise: (  30 minutes):  Exercises per grid below to improve mobility and strength. Required moderate verbal and manual cues to promote proper body alignment, promote proper body posture and promote proper body mechanics. Progressed resistance, range and repetitions as indicated.    Date:  19 Date:  6/10/19 Date:  19 Date:  19 Date:  19   Activity/Exercise Parameters Parameters Parameters Parameters Parameters   ER AAROM in supine 5 minutes       AAROM shoulder flexion in supine 5 minutes       Scapular retraction 3 x 20       Upper trapezius stretch 2 minutes       Isometric Abd R shoulder  1 x 10 2 x 10  2 x 10   Isometric Flex R shoulder  1 x 10 2 x 10  2 x 10   Isometric ER R shoulder  1 x 10 2 x 10  2 x 10   Isometric IR R shoulder  1 x 10 2 x 10  2 x 10   Isometric Ext R shoulder  1 x 10 2 x 10  2 x 10   AROM R shoulder flexion   1 x 10  1 x 10   AROM R shoulder abduction   1 x 10  1 x 10   UBE    3 min forward/backward Level 2 3 min forward/backward Level    Lat Pull down    2 x 10 green tB 2 x 10 green tB   R shoulder IR    2 x 10 green tB 2 x 10 green tB   R shoulder ER    2 x 10 green tB 2 x 10 green tB   Rows    2 x 10 green tB 2 x 10 green tB   Manual Therapy (    0 minutes  ): Manual techniques to facilitate improved motion and decreased pain. (Used abbreviations: MET - muscle energy technique; PNF - proprioceptive neuromuscular facilitation; NMR - neuromuscular re-education; a/p - anterior to posterior; p/a - posterior to anterior)   · Joint mobilization: R glenohumeral, inferior glide, grade II  · Soft tissue mobilization: R upper trapezius, levator scapulae, latissimus dorsi, infraspinatus, pec minor  · Manual cervical traction    MODALITIES: (0 minutes) Patient seated  with arms supported with wedge and pillows for comfort for IFES at level 18 to right upper trap and shoulder with ice pack all to help decrease inflammation and pain. [facility]  Treatment/Session Summary:    · Response to Treatment: Patient demonstrated improved endurance with therapeutic exercise of R LE.  · Communication/Consultation:  None today  · Equipment provided today:  None today  · Recommendations/Intent for next treatment session: Next visit will focus on pain relief; consider pain free R shoulder ROM and gradual progression of shoulder strengthening.  .    Total Treatment Billable Duration:  30 minutes  PT Patient Time In/Time Out  Time In: 1300  Time Out: [facility]    Future Appointments   Date Time Provider Ced Hope   7/3/2019  1:00 PM [facility] PHYSICIAL THERAPIST JEAN MCALLISTER   7/8/2019  1:00 PM [facility] PHYSICIAL THERAPIST JEAN MCALLISTER   7/10/2019  1:00 PM SFYUNIOR PHYSICIAL THERAPIST JEAN MCALLISTER

## 2019-07-03 ENCOUNTER — HOSPITAL ENCOUNTER (OUTPATIENT)
Dept: PHYSICAL THERAPY | Age: 59
Discharge: HOME | End: 2019-07-03
Payer: COMMERCIAL

## 2019-07-03 NOTE — PROGRESS NOTES
Taisha Izaguirre  : 1960  Primary: Ruthy Montalvo  Secondary:  [address] [place]  at [facility] [address], [address] [facility], [address]  [phone]   [phone]      7/3/19    PT Note:    Patient cancelled PT appointment for this date. Will follow-up with patient at next scheduled visit.     Chaka Soriano, PT, DPT

## 2019-07-08 ENCOUNTER — HOSPITAL ENCOUNTER (OUTPATIENT)
Dept: PHYSICAL THERAPY | Age: 59
Discharge: HOME | End: 2019-07-08
Payer: COMMERCIAL

## 2019-07-08 PROCEDURE — 97110 THERAPEUTIC EXERCISES: CPT

## 2019-07-08 NOTE — PROGRESS NOTES
Rose Dunlevy  : 1960  Primary: Kiya Montalvo  Secondary:  [address] at [address]  [phone]   [phone]      OUTPATIENT PHYSICAL THERAPY: Daily Treatment Note 2019  Visit Count:  10    ICD-10: Treatment Diagnosis: Pain in Joint, R shoulder [M25.511]; Incomplete Rotator Cuff Tear, Not Specified as Traumatic [M75.111]; Cervicalgia [M54.2]  Precautions/Allergies:   Patient has no known allergies. TREATMENT PLAN:  Effective Dates: 2019 TO 2019 (60 days). Frequency/Duration: 2 times a week for 60 Day(s) MEDICAL/REFERRING DIAGNOSIS:  Left shoulder pain [M25.512]   DATE OF ONSET: 3/1/19  REFERRING PHYSICIAN: Jocy Manzo MD MD Orders: Evaluate and Treat  Return MD Appointment: 19     Pre-treatment Symptoms/Complaints:Patient reports continuing to experience pain in R shoulder with activities of daily living. Pain: Initial:  6/10 Post Session:  5/10   Medications Last Reviewed:  2019  Updated Objective Findings:  None Today  TREATMENT:     Therapeutic Exercise: (  30 minutes):  Exercises per grid below to improve mobility and strength. Required moderate verbal and manual cues to promote proper body alignment, promote proper body posture and promote proper body mechanics. Progressed resistance, range and repetitions as indicated.    Date:  6/10/19 Date:  19 Date:  19 Date:  19 Date:  19   Activity/Exercise Parameters Parameters Parameters Parameters Parameters   Isometric Abd R shoulder 1 x 10 2 x 10  2 x 10    Isometric Flex R shoulder 1 x 10 2 x 10  2 x 10    Isometric ER R shoulder 1 x 10 2 x 10  2 x 10    Isometric IR R shoulder 1 x 10 2 x 10  2 x 10    Isometric Ext R shoulder 1 x 10 2 x 10  2 x 10    AROM R shoulder flexion  1 x 10  1 x 10 1 x 10   AROM R shoulder abduction  1 x 10  1 x 10 1 x 10   UBE   3 min forward/backward Level 2 3 min forward/backward Level 2 3 min forward/backward Level 2   Lat Pull down   2 x 10 green tB 2 x 10 green tB 2 x 10 green tB   R shoulder IR   2 x 10 green tB 2 x 10 green tB 2 x 10 green tB   R shoulder ER   2 x 10 green tB 2 x 10 green tB 2 x 10 green tB   Rows   2 x 10 green tB 2 x 10 green tB 2 x 10 green tB   Manual Therapy (    0 minutes  ): Manual techniques to facilitate improved motion and decreased pain. (Used abbreviations: MET - muscle energy technique; PNF - proprioceptive neuromuscular facilitation; NMR - neuromuscular re-education; a/p - anterior to posterior; p/a - posterior to anterior)   · Joint mobilization: R glenohumeral, inferior glide, grade II  · Soft tissue mobilization: R upper trapezius, levator scapulae, latissimus dorsi, infraspinatus, pec minor  · Manual cervical traction    MODALITIES: (0 minutes) Patient seated  with arms supported with wedge and pillows for comfort for IFES at level 18 to right upper trap and shoulder with ice pack all to help decrease inflammation and pain. [facility] Portal  Treatment/Session Summary:    · Response to Treatment: Patient reported increased pain in R shoulder with R shoulder abduction. · Communication/Consultation:  None today  · Equipment provided today:  None today  · Recommendations/Intent for next treatment session: Next visit will focus on pain relief; consider pain free R shoulder ROM and gradual progression of shoulder strengthening.  .    Total Treatment Billable Duration:  30 minutes  PT Patient Time In/Time Out  Time In: 1300  Time Out: 1030 [facility]    Future Appointments   Date Time Provider Ced Hope   7/10/2019  1:00 PM [facility] PHYSICIAL THERAPIST JEAN MCALLISTER

## 2019-07-10 ENCOUNTER — HOSPITAL ENCOUNTER (OUTPATIENT)
Dept: PHYSICAL THERAPY | Age: 59
Discharge: HOME | End: 2019-07-10
Payer: COMMERCIAL

## 2019-07-10 PROCEDURE — 97110 THERAPEUTIC EXERCISES: CPT

## 2019-07-10 NOTE — PROGRESS NOTES
Tom Joseph  : 1960  Primary: Crow Baker [employer]  Secondary:  [address] at [address]  [phone]   [phone]      OUTPATIENT PHYSICAL THERAPY: Daily Treatment Note 7/10/2019  Visit Count:  11    ICD-10: Treatment Diagnosis: Pain in Joint, R shoulder [M25.511]; Incomplete Rotator Cuff Tear, Not Specified as Traumatic [M75.111]; Cervicalgia [M54.2]  Precautions/Allergies:   Patient has no known allergies. TREATMENT PLAN:  Effective Dates: 2019 TO 2019 (60 days). Frequency/Duration: 2 times a week for 60 Day(s) MEDICAL/REFERRING DIAGNOSIS:  Left shoulder pain [M25.512]   DATE OF ONSET: 3/1/19  REFERRING PHYSICIAN: Yuliet Pryor MD MD Orders: Evaluate and Treat  Return MD Appointment: 19     Pre-treatment Symptoms/Complaints:Patient reports R shoulder hasn't hurt as much when he isn't having to use it around the house, or assisting in care of his young children. Pain: Initial:  6/10 Post Session:  7/10   Medications Last Reviewed:  7/10/2019  Updated Objective Findings:  None Today  TREATMENT:     Therapeutic Exercise: (  15 minutes):  Exercises per grid below to improve mobility and strength. Required moderate verbal and manual cues to promote proper body alignment, promote proper body posture and promote proper body mechanics. Progressed resistance, range and repetitions as indicated.    Date:  6/10/19 Date:  19 Date:  19 Date:  19 Date:  19 Date:  7/10/19   Activity/Exercise Parameters Parameters Parameters Parameters Parameters Parameters   Isometric Abd R shoulder 1 x 10 2 x 10  2 x 10     Isometric Flex R shoulder 1 x 10 2 x 10  2 x 10     Isometric ER R shoulder 1 x 10 2 x 10  2 x 10     Isometric IR R shoulder 1 x 10 2 x 10  2 x 10     Isometric Ext R shoulder 1 x 10 2 x 10  2 x 10     AROM R shoulder flexion  1 x 10  1 x 10 1 x 10 1 x 10   AROM R shoulder abduction  1 x 10  1 x 10 1 x 10 1 x 10   UBE   3 min forward/backward Level 2 3 min forward/backward Level 2 3 min forward/backward Level 2 3 min forward/backward Level 2   Lat Pull down   2 x 10 green tB 2 x 10 green tB 2 x 10 green tB 2 x 10 green tB   R shoulder IR   2 x 10 green tB 2 x 10 green tB 2 x 10 green tB    R shoulder ER   2 x 10 green tB 2 x 10 green tB 2 x 10 green tB    Rows   2 x 10 green tB 2 x 10 green tB 2 x 10 green tB 2 x 10 green tB   Manual Therapy (    0 minutes  ): Manual techniques to facilitate improved motion and decreased pain. (Used abbreviations: MET - muscle energy technique; PNF - proprioceptive neuromuscular facilitation; NMR - neuromuscular re-education; a/p - anterior to posterior; p/a - posterior to anterior)   · Joint mobilization: R glenohumeral, inferior glide, grade II  · Soft tissue mobilization: R upper trapezius, levator scapulae, latissimus dorsi, infraspinatus, pec minor  · Manual cervical traction    MODALITIES: (0 minutes) Patient seated  with arms supported with wedge and pillows for comfort for IFES at level 18 to right upper trap and shoulder with ice pack all to help decrease inflammation and pain. [facility] Portal  Treatment/Session Summary:    · Response to Treatment: Patient reported increased pain in R shoulder following therapeutic exercise. Verbal and tactile cues for body mechanics with therapeutic exercise. · Communication/Consultation:  None today  · Equipment provided today:  None today  · Recommendations/Intent for next treatment session: Next visit will focus on pain relief; consider pain free R shoulder ROM and gradual progression of shoulder strengthening. .    Total Treatment Billable Duration:  15 minutes  PT Patient Time In/Time Out  Time In: 1300  Time Out: 1618 [facility]    No future appointments.

## 2019-07-17 ENCOUNTER — HOSPITAL ENCOUNTER (OUTPATIENT)
Dept: PHYSICAL THERAPY | Age: 59
Discharge: HOME | End: 2019-07-17
Payer: COMMERCIAL

## 2019-07-17 PROCEDURE — 97110 THERAPEUTIC EXERCISES: CPT

## 2019-07-17 NOTE — PROGRESS NOTES
Rambo Meals  : 1960  Primary: Laurita Orr [employer]  Secondary:  [address]  at [facility]  [address] [facility], [address] [place] [address]  [phone]   [phone]      19    PT Note:    Patient cancelled appointment this date. Will follow up with patient at next scheduled visit.     Manoj Garvey, PT, DPT

## 2019-07-17 NOTE — THERAPY EVALUATION
Bong Navarrete  : 1960  Primary: Iraida Hurisdrea  Secondary:  [address]  at [facility], [address], [place], [address] [facility]  [phone]   [phone]       OUTPATIENT PHYSICAL THERAPY:Discharge Summary 2019   ICD-10: Treatment Diagnosis: Pain in Joint, R shoulder [M25.511]; Incomplete Rotator Cuff Tear, Not Specified as Traumatic [M75.111]; Cervicalgia [M54.2]  Precautions/Allergies:   Patient has no known allergies. TREATMENT PLAN:  Effective Dates: 2019 TO 2019 (60 days). Frequency/Duration: 2 times a week for 60 Day(s) MEDICAL/REFERRING DIAGNOSIS:  Left shoulder pain [M25.512]   DATE OF ONSET: 3/1/19  REFERRING PHYSICIAN: Eric Wylie MD MD Orders: Evaluate and Treat  Return MD Appointment: TBD by patient     Progress Report:  Mr. Morales continues to present with weakness and pain in R shoulder following workplace accident in early March. Patient presents with decreased AROM of R shoulder as well as painful limitations in PROM of R shoulder. Patient with decreased strength in R shoulder showing signs of possible R shoulder rotator cuff tear. Patient continues to present with muscle guarding of R upper trapezius, pectoralis, and cervical musculature. Pt. tto be discharged from physical therapy at this time. Patient to be scheduled for surgical intervention for R rotator cuff repair. PROBLEM LIST (Impacting functional limitations):  1. Decreased Strength  2. Decreased ADL/Functional Activities  3. Increased Pain  4. Decreased Activity Tolerance  5. Decreased Flexibility/Joint Mobility  6. Decreased [place] with Home Exercise Program INTERVENTIONS PLANNED: (Treatment may consist of any combination of the following)  1. Cryotherapy  2. Home Exercise Program (HEP)  3. Manual Therapy  4. Neuromuscular Re-education/Strengthening  5. Range of Motion (ROM)  6. Therapeutic Activites  7.  Therapeutic Exercise/Strengthening GOALS: (Goals have been discussed and agreed upon with patient.)  Discharge Goals: Time Frame: 6 weeks  1. Pt. Will demonstrate < 4/10 R shoulder pain to improve sleep at night. NOT MET  2. Pt. Will demonstrate >160 degrees of R shoulder flexion to improve overhead activities at work. NOT MET  3. Pt. Will demonstrate 5/5 shoulder flexion to improve work lifting activities. NOT MET  4. Pt. Will score < 20% disability on the quick Disabilities of the Shoulder and Hand (DASH) questionnaire to demonstrate improved pain and function. NOT MET    OUTCOME MEASURE:   Tool Used: Disabilities of the Arm, Shoulder and Hand (DASH) Questionnaire - Quick Version  Score:  Initial: 29/55  Most Recent: 45/55 (Date: 7/17/19 )   Interpretation of Score: The DASH is designed to measure the activities of daily living in person's with upper extremity dysfunction or pain. Each section is scored on a 1-5 scale, 5 representing the greatest disability. The scores of each section are added together for a total score of 55. MEDICAL NECESSITY:   · Patient is expected to demonstrate progress in strength and range of motion to increase independence with work activities and ADL's.  REASON FOR SERVICES/OTHER COMMENTS:  · Patient will benefit from skilled PT to address impairments identified through evaluation and return to previous ADL and recreational capacity. Rehabilitation Potential For Stated Goals: Good  Regarding Marta Hamilton's therapy, I certify that the treatment plan above will be carried out by a therapist or under their direction. Thank you for this referral,  Erica Collado          PAIN/SUBJECTIVE:   Initial:   8/10 Post Session:  8/10   HISTORY:   History of Injury/Illness (Reason for Referral):  Pt. Attends PT c/o R shoulder pain since March 1st after lifting a heavy table at work. Pt. Notes the pain was initially managed with injections and he was put in a sling for 1 month to address pain. Pt.  Then consulted an orthopedic physician and MRI diagnosed large rotator cuff tear in the R shoulder. Per patient, moderate glenohumeral arthritis is also present and physician recommended a total shoulder replacement. Pt. Would prefer not to have total shoulder replacement and seeks alternative intervention at this time. Currently, pt. Is not able to raise arm without pain and is not able to work. 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 Secondary complaints include right neck pain. Past Medical History/Comorbidities:   Mr. Morales  has a past medical history of Drug addiction ([address] and Rotator cuff tear. He also has no past medical history of Aneurysm ([address] Arrhythmia, Arthritis, Asthma, Autoimmune disease ([address] CAD (coronary artery disease), Cancer ([address] Chronic kidney disease, Chronic pain, Coagulation defects, COPD, Diabetes ([address] GERD (gastroesophageal reflux disease), Heart failure ([address] Hypertension, Liver disease, Psychiatric disorder, PUD (peptic ulcer disease), Seizures ([address] Stroke ([address] Thromboembolus ([address] or Thyroid disease. Mr. Morales  has a past surgical history that includes hx knee arthroscopy and pr upper arm/elbow surgery unlisted. L rotator cuff repair 2011  Social History/Living Environment:     lives in a single story home with family  Prior Level of Function/Work/Activity:  Functioned independently without limitations     Ambulatory/Rehab Services H2 Model Falls Risk Assessment   Risk Factors:       (1)  Gender [Male] Ability to Rise from Chair:       (0)  Ability to rise in a single movement   Falls Prevention Plan:       No modifications necessary   Total: (5 or greater = High Risk): 1   ©2010 [facility] [employer]. All Rights Reserved. [facility] Patent #[id number].  Federal Law prohibits the replication, distribution or use without written permission from [place] [facility]   Current Medications:     No current outpatient medications on file. Date Last Reviewed:  7/17/2019   Number of Personal Factors/Comorbidities that affect the Plan of Care: 0: LOW COMPLEXITY   EXAMINATION:    ROM:   Re-assess date:  6/26/19 7/17/19     RIGHT LEFT Right Right    Shoulder flexion  60 AROM (pain)  162 153 AROM 110 AROM    Shoulder external rotation  60 PROM (pain)  79 38 AROM 86 AROM    Shoulder internal rotation  15 PROM (pain)  35 35 AROM 38 AROM            Strength:         RIGHT  LEFT  Right Right    Shoulder flexion  2+/5 (pain)  5/5 3+/5 3+/5    Shoulder ER  3+/5 (pain) 5/5  3+/5 3+/5    Shoulder IR 5/5 (pain) 5/5 5/5 5/5    Scapular retraction  5/5 5/5  5/5 5/5            Flexibility:         RIGHT LEFT Right Left    Pectoralis Major/Minor  limited limited  limited limited    Latissimus Dorsi  -- --       Upper trapezius  --  --              Special Testing         RIGHT LEFT      IR lag sign (+)         ER lag sign Unable to test         Drop arm Unable to test         [place] (-) pain in shoulder blade   (-)              Joint Mobility:          RIGHT LEFT         --  --                          Body Structures Involved:  1. Joints  2. Muscles Body Functions Affected:  1. Sensory/Pain  2. Neuromusculoskeletal  3. Movement Related Activities and Participation Affected:  1. General Tasks and Demands  2. Mobility  3. Self Care  4.  Community, Social and [place]   Number of elements (examined above) that affect the Plan of Care: 3: MODERATE COMPLEXITY   CLINICAL PRESENTATION:   Presentation: Evolving clinical presentation with changing clinical characteristics: MODERATE COMPLEXITY   CLINICAL DECISION MAKING:   Use of outcome tool(s) and clinical judgement create a POC that gives a: Questionable prediction of patient's progress: MODERATE COMPLEXITY     TREATMENT:      Therapeutic Exercise: (  30 minutes):  Exercises per grid below to improve mobility and strength.  Required moderate verbal and manual cues to promote proper body alignment, promote proper body posture and promote proper body mechanics.  Progressed resistance, range and repetitions as indicated.    Date:  5/24/19 Date:  6/10/19 Date:  6/17/19 Date:  6/24/19 Date:  6/26/19 Date:  7/17/19   Activity/Exercise Parameters Parameters Parameters Parameters Parameters Parameters   ER AAROM in supine 5 minutes           AAROM shoulder flexion in supine 5 minutes           Scapular retraction 3 x 20           Upper trapezius stretch 2 minutes           Isometric Abd R shoulder   1 x 10 2 x 10       Isometric Flex R shoulder   1 x 10 2 x 10       Isometric ER R shoulder   1 x 10 2 x 10       Isometric IR R shoulder   1 x 10 2 x 10       Isometric Ext R shoulder   1 x 10 2 x 10       AROM R shoulder flexion     1 x 10       AROM R shoulder abduction     1 x 10       UBE       3 min forward/backward Level 2  3 min forward/backward   Level 2   Lat Pull down       2 x 10 green tB 2 x 10 green tB 2 x 10 green tB   R shoulder IR       2 x 10 green tB 2 x 10 green tB 2 x 10 green tB   R shoulder ER       2 x 10 green tB 2 x 10 green tB 2 x 10 green tB   Rows       2 x 10 green tB 2 x 10 green tB 2 x 10 green tB   Manual Therapy (   0 minutes  ): Manual techniques to facilitate improved motion and decreased pain. (Used abbreviations: MET - muscle energy technique; PNF - proprioceptive neuromuscular facilitation; NMR - neuromuscular re-education; a/p - anterior to posterior; p/a - posterior to anterior)   · Joint mobilization: R glenohumeral, inferior glide, grade II  · Soft tissue mobilization: R upper trapezius, levator scapulae, latissimus dorsi, infraspinatus, pec minor  · Manual cervical traction    Treatment/Session Summary:    · Response to Treatment: Patient required verbal and tactile cues for body mechanics with therapeutic exercise. Patient fatigued in R shoulder with therapeutic exercise.   · Communication/Consultation:  None today  · Equipment provided today:  None today  · Recommendations/Intent for next treatment session: Patient to be discharged at this time.     Total Treatment Billable Duration:  30 minutes  PT Patient Time In/Time Out  Time in: 1335  Time out: [address] [facility]

## 2019-07-24 ENCOUNTER — APPOINTMENT (OUTPATIENT)
Dept: PHYSICAL THERAPY | Age: 59
End: 2019-07-24
Payer: COMMERCIAL

## 2019-08-22 ENCOUNTER — HOSPITAL ENCOUNTER (OUTPATIENT)
Dept: PHYSICAL THERAPY | Age: 59
Discharge: HOME | End: 2019-08-22
Payer: COMMERCIAL

## 2019-08-22 PROCEDURE — 97162 PT EVAL MOD COMPLEX 30 MIN: CPT

## 2019-08-22 NOTE — PROGRESS NOTES
Trena Veronica  : 1960  Primary: Carry José Miguel Hurisdrea  Secondary:  Trena Veronica  : 1960  Primary: Carry José Miguel Hurisdrea  Secondary:  [address] at [facility]  [address], [address] [facility], [place] [address] [address] [facility]  [phone]   [phone]          SEE EVAL FOR DETAILS 2019   OUTPATIENT PHYSICAL THERAPY: Daily Treatment Note 2019  Visit Count:  13    ICD-10: Treatment Diagnosis: Pain in Joint, R shoulder [M25.511]; Incomplete Rotator Cuff Tear, Not Specified as Traumatic [M75.111]; Cervicalgia [M54.2]  Precautions/Allergies:   Patient has no known allergies. TREATMENT PLAN:  Effective Dates: 2019 TO 2019 (90 days). Frequency/Duration:up to  3 times a week for 90 Days   MEDICAL/REFERRING DIAGNOSIS:  Left shoulder pain [M25.512]   DATE OF ONSET: 3/1/19  REFERRING PHYSICIAN: GUI Hernandez MD Orders: Evaluate and Treat  Return MD Appointment:   Future Appointments   Date Time Provider Ced Hope   2019  [facility]00 PM [facility]   2019  1:00 PM David Pisano [facility]   2019  1:00 PM Sisi Santa, PT [facility]   2019  1:00 PM Sisi Arina, PT [facility]   2019  1:00 PM Sisi Santa, PT [facility]   2019  1:00 PM Sisi Santa, PT [facility]   2019  1:00 PM Sisi Santa, PT [facility]          Pre-treatment Symptoms/Complaints:  Pain: Initial:   Post Session:     Medications Last Reviewed:  2019  Updated Objective Findings:  None Today  TREATMENT:     Therapeutic Exercise: (  0 minutes):  Exercises per grid below to improve mobility and strength. Required moderate verbal and manual cues to promote proper body alignment, promote proper body posture and promote proper body mechanics. Progressed resistance, range and repetitions as indicated.            Activity/Exercise         Isometric Abd R shoulder Isometric Flex R shoulder         Isometric ER R shoulder         Isometric IR R shoulder         Isometric Ext R shoulder         AROM R shoulder flexion         AROM R shoulder abduction         UBE         Lat Pull down         R shoulder IR         R shoulder ER         Rows         Manual Therapy (    0 minutes  ): Manual techniques to facilitate improved motion and decreased pain. (Used abbreviations: MET - muscle energy technique; PNF - proprioceptive neuromuscular facilitation; NMR - neuromuscular re-education; a/p - anterior to posterior; p/a - posterior to anterior)   · Joint mobilization: R glenohumeral, inferior glide, grade II  · Soft tissue mobilization: R upper trapezius, levator scapulae, latissimus dorsi, infraspinatus, pec minor  · Manual cervical traction    MODALITIES: (0 minutes) Patient seated  with arms supported with wedge and pillows for comfort for IFES at level 18 to right upper trap and shoulder with ice pack all to help decrease inflammation and pain. [employer] Portal  Treatment/Session Summary:    · Response to Treatment:   · Communication/Consultation:  None today  · Equipment provided today:  None today  · Recommendations/Intent for next treatment session: Next visit will focus on pain relief; consider pain free R shoulder ROM and gradual progression of shoulder strengthening. Unattended estim UBE and theraband training  .     Total Treatment Billable Duration:  minutes     Sarah Elise PT    Future Appointments   Date Time Provider Ced Hope   8/28/2019  1:00 PM Lora Torres [facility]   8/30/2019  1:00 PM Meaghan Gutierrez PTA [facility]   9/4/2019  1:00 PM Solange Cuello PT [facility]FD   9/5/2019  1:00 PM Stevyohannes Cuello PT [facility] SFD   9/11/2019  1:00 PM Stevphen Cuate, PT [facility] SFD   9/12/2019  1:00 PM Stevphen Cuate, [facility] S[facility]   9/18/2019  1:00 PM Stevyohannes Cuello, PT [facility]

## 2019-08-22 NOTE — THERAPY EVALUATION
Dennie Lakes  : 1960  Primary: Jose Bone [employer]  Secondary:  [address] at [address]  [phone]   [phone]        OUTPATIENT PHYSICAL THERAPY:Initial Assessment 2019   ICD-10: Treatment Diagnosis: Pain in Joint, R shoulder [M25.511]; Incomplete Rotator Cuff Tear, Not Specified as Traumatic [M75.111]; Cervicalgia [M54.2]  Precautions/Allergies:   Patient has no known allergies. TREATMENT PLAN:  Effective Dates: 2019 TO 2019 (90 days). Frequency/Duration:up to 3 times a week for 90 Days   MEDICAL/REFERRING DIAGNOSIS:  Primary osteoarthritis, right shoulder [M19.011]   DATE OF ONSET: 3/1/19  REFERRING PHYSICIAN: GUI Porras MD Orders: Evaluate and Treat  Return MD Appointment:   Future Appointments   Date Time Provider Ced Hope   2019  1:00 PM Kurt Jimenez, [facility] S[facility]   2019  1:00 PM [place] Barbara, [facility] S[facility]   2019  1:00 PM Michelle Every, PT [facility] SFD   2019  1:00 PM Michelle Every, PT [facility]FD   2019  1:00 PM Michelle Every, PT [facility] SFD   2019  1:00 PM Michelle Every, PT [facility] S[facility]   2019  1:00 PM Michelle Every, PT [facility] SFD          INITIAL ASSESSMENT:  Mr. Morales presents with continued signs and symptoms of a R shoulder rotator cuff derangement. Pt. Presents with moderate-maximal R shoulder pain, ROM deficits, weakness, and muscular guarding in the R upper trapezius, pectoralis, and latissimus dorsi musculature. PT is unable to test external rotation lag sign and drop arm test secondary to pain at initial evaluation. Secondary complaints include neck pain with likely guarding and compensation due to chronicity of current condition. He has received shoulder injections for pain. He reortss shoulder surgery is anticipated for the condition. He reports  Pt.  Will benefit from skilled PT to address pain, normalize ROM, and improve strength for work activities and ADL's. PROBLEM LIST (Impacting functional limitations):  1. Decreased Strength  2. Decreased ADL/Functional Activities  3. Increased Pain  4. Decreased Activity Tolerance  5. Decreased Flexibility/Joint Mobility  6. Decreased [place] with Home Exercise Program INTERVENTIONS PLANNED: (Treatment may consist of any combination of the following)  1. Cryotherapy  2. Home Exercise Program (HEP)  3. Manual Therapy  4. Neuromuscular Re-education/Strengthening  5. Range of Motion (ROM)  6. Therapeutic Activites  7. Therapeutic Exercise/Strengthening  8. Unattended estim   9. Moist hot pack      GOALS: (Goals have been discussed and agreed upon with patient.)  Discharge Goals: Time Frame: up to 90 days z  1. Pt. Will demonstrate < 6/10 R shoulder pain to improve sleep at night. 2. Pt. Will demonstrate >110 degrees of R shoulder flexion to improve overhead activities at work. 3. Pt. Will demonstrate 5/5 shoulder flexion to improve work lifting activities. 4. Pt. Will score 30 on the quick Disabilities of the Shoulder and Hand (DASH) questionnaire to demonstrate improved pain and function. OUTCOME MEASURE:   Tool Used: Disabilities of the Arm, Shoulder and Hand (DASH) Questionnaire - Quick Version  Score:  Initial: 47/55  Most Recent: X/55 (Date: -- )   Interpretation of Score: The DASH is designed to measure the activities of daily living in person's with upper extremity dysfunction or pain. Each section is scored on a 1-5 scale, 5 representing the greatest disability. The scores of each section are added together for a total score of 55. MEDICAL NECESSITY:   · Patient is expected to demonstrate progress in strength and range of motion to increase independence with work activities and ADL's. · Patient demonstrates fair rehab potential due to higher previous functional level.   REASON FOR SERVICES/OTHER COMMENTS:  · Patient will benefit from skilled PT to address impairments identified through evaluation and return to previous ADL and recreational capacity. Total Duration:       Rehabilitation Potential For Stated Goals: Fair  Regarding Uvaldo Hamilton's therapy, I certify that the treatment plan above will be carried out by a therapist or under their direction. Thank you for this referral,  Sarah Willoughby, PT     Referring Physician Signature: GUI Crook _______________________________ Date _____________     PAIN/SUBJECTIVE:   Initial:   9/10 right shoulder  Post Session:  9/10 right shoulder    HISTORY:   History of Injury/Illness (Reason for Referral):  Pt. Attends PT c/o R shoulder pain since March 1st after lifting a heavy table at work. Pt. Notes the pain was initially managed with injections and he was put in a sling for 1 month to address pain. Pt. Then consulted an orthopedic physician and MRI diagnosed large rotator cuff tear in the R shoulder. Per patient, moderate glenohumeral arthritis is also present and physician recommended a total shoulder replacement. Pt. Would prefer not to have total shoulder replacement and seeks alternative intervention at this time. Currently, pt. Is not able to raise arm without pain and is not able to work. Pt. Works maintenance at [employer], climbing ladders, and carrying various objects. Secondary complaints include right neck pain. Past Medical History/Comorbidities:   Mr. Morales  has a past medical history of Drug addiction ([address] and Rotator cuff tear.  He also has no past medical history of Aneurysm ([address] Arrhythmia, Arthritis, Asthma, Autoimmune disease ([address] CAD (coronary artery disease), Cancer ([address] Chronic kidney disease, Chronic pain, Coagulation defects, COPD, Diabetes ([address] GERD (gastroesophageal reflux disease), Heart failure ([address] Hypertension, Liver disease, Psychiatric disorder, PUD (peptic ulcer disease), Seizures ([facility] [address] [address] Stroke ([facility] [address] Thromboembolus ([facility] [facility] or Thyroid disease. Mr. Morales  has a past surgical history that includes hx knee arthroscopy and pr upper arm/elbow surgery unlisted. L rotator cuff repair 2011  Social History/Living Environment:     lives in a single story home with family  Prior Level of Function/Work/Activity:  Functioned independently without limitations     Ambulatory/Rehab Services H2 Model Falls Risk Assessment   Risk Factors:       (1)  Gender [Male] Ability to Rise from Chair:       (0)  Ability to rise in a single movement   Falls Prevention Plan:       No modifications necessary   Total: (5 or greater = High Risk): 1   ©2010 [facility] [employer]. All Rights Reserved. [facility] Patent #[id number]. Federal Law prohibits the replication, distribution or use without written permission from [employer]   Current Medications:     No current outpatient medications on file.    Date Last Reviewed:  8/22/2019   Number of Personal Factors/Comorbidities that affect the Plan of Care: 1-2: MODERATE COMPLEXITY   EXAMINATION:    ROM:       RIGHT LEFT    Shoulder flexion  80 AROM (pain)  162    Shoulder external rotation  80 PROM (pain)  79    Shoulder internal rotation scratch Greater trochancter (pain)  35          Strength:       RIGHT  LEFT     Shoulder flexion  2+/5 (pain)  5/5    Shoulder ER  3+/5 (pain) 5/5     Shoulder IR 2+/5 (pain) 5/5    Scapular retraction  4/5 5/5           Flexibility:       RIGHT LEFT    Pectoralis Major/Minor  limited limited     Latissimus Dorsi  -- --     Upper trapezius  --  --          Special Testing       RIGHT LEFT    IR lag sign (+)       ER lag sign Unable to test secondary to pain and apprehension      Drop arm Unable to test secondary to pain and apprehension       Spurlings (-) pain in shoulder blade   (-)          Joint Mobility:        RIGHT LEFT       --  --                    Body Structures Involved:  1. Joints  2. Muscles Body Functions Affected:  1. Sensory/Pain  2. Neuromusculoskeletal  3. Movement Related Activities and Participation Affected:  1. General Tasks and Demands  2. Mobility  3. Self Care  4.  Community, Social and [place]   Number of elements (examined above) that affect the Plan of Care: 3: MODERATE COMPLEXITY   CLINICAL PRESENTATION:   Presentation: Evolving clinical presentation with changing clinical characteristics: MODERATE COMPLEXITY   CLINICAL DECISION MAKING:   Use of outcome tool(s) and clinical judgement create a POC that gives a: Questionable prediction of patient's progress: MODERATE COMPLEXITY

## 2019-08-28 ENCOUNTER — HOSPITAL ENCOUNTER (OUTPATIENT)
Dept: PHYSICAL THERAPY | Age: 59
Discharge: HOME | End: 2019-08-28
Payer: COMMERCIAL

## 2019-08-28 PROCEDURE — 97110 THERAPEUTIC EXERCISES: CPT

## 2019-08-28 PROCEDURE — 97014 ELECTRIC STIMULATION THERAPY: CPT

## 2019-08-28 PROCEDURE — 97140 MANUAL THERAPY 1/> REGIONS: CPT

## 2019-08-28 NOTE — PROGRESS NOTES
Rock Fernández  : 1960  Primary: Alessia Hurisk  Secondary:  Rock Fernández  : 1960  Primary: Alessia Hurisk  Secondary:  [address] at [facility]  [address]ivian [address], [address], [place], [address] [facility]  [phone]   [phone]          SEE EVAL FOR DETAILS 2019   OUTPATIENT PHYSICAL THERAPY: Daily Treatment Note 2019  Visit Count:  14    ICD-10: Treatment Diagnosis: Pain in Joint, R shoulder [M25.511]; Incomplete Rotator Cuff Tear, Not Specified as Traumatic [M75.111]; Cervicalgia [M54.2]  Precautions/Allergies:   Patient has no known allergies. TREATMENT PLAN:  Effective Dates: 2019 TO 2019 (90 days). Frequency/Duration:up to  3 times a week for 90 Days   MEDICAL/REFERRING DIAGNOSIS:  Left shoulder pain [M25.512]   DATE OF ONSET: 3/1/19  REFERRING PHYSICIAN: GUI Mccurdy MD Orders: Evaluate and Treat  Return MD Appointment:   Future Appointments   Date Time Provider Ced Hope   2019  1:00 PM Tigist Jewell [facility] SFD   2019  1:00 PM [place], PT [facility] SFD   2019  1:00 PM [place], PT [facility] SFD   2019  1:00 PM [place], PT [facility] SFD   2019  1:00 PM  [place], PT [facility] SFD   2019  1:00 PM [place], PT [facility] SFD          Pre-treatment Symptoms/Complaints:  He reports he saw a pain management doctor yesterday. He reports his arm just needs surgery in order to be fixed. Pain: Initial:    7.5/10 Post Session:  7/10   Medications Last Reviewed:  2019  Updated Objective Findings:  None Today  TREATMENT:     Therapeutic Exercise: (  15 minutes):  Exercises per grid below to improve mobility and strength. Required moderate verbal and manual cues to promote proper body alignment, promote proper body posture and promote proper body mechanics. Progressed resistance, range and repetitions as indicated. Activity/Exercise         Isometric Abd R shoulder         Isometric Flex R shoulder         Isometric ER R shoulder         Isometric IR R shoulder         Isometric Ext R shoulder         AROM R shoulder flexion Wand   X 10 B        AROM R shoulder abduction With wand   X 10 B         UBE Level 1  4/4   8 minutes        Lat Pull down         R shoulder IR         R shoulder ER With wand   X 10 B         Rows         Manual Therapy (    25 minutes  ): Manual techniques to facilitate improved motion and decreased pain. (Used abbreviations: MET - muscle energy technique; PNF - proprioceptive neuromuscular facilitation; NMR - neuromuscular re-education; a/p - anterior to posterior; p/a - posterior to anterior)   · Joint mobilization: R glenohumeral, inferior glide, grade II  · Soft tissue mobilization: R upper trapezius, levator scapulae, latissimus dorsi, infraspinatus, pec minor      MODALITIES: (10 minutes) Patient supine with R arm supported and legs on wedge for comfort for IFES at level 20 to right upper trap and shoulder with moist heat pack all to help decrease pain and increase motion. [employer] Portal  Treatment/Session Summary:    · Response to Treatment:   · Communication/Consultation:  None today  · Equipment provided today:  None today  · Recommendations/Intent for next treatment session: Next visit will focus on pain relief; consider pain free R shoulder ROM and gradual progression of shoulder strengthening. Unattended estim UBE and theraband training  .     Total Treatment Billable Duration: 50 minutes   PT Patient Time In/Time Out  Time In: 1300  Time Out: [address], [facility]    Future Appointments   Date Time Provider Ced Hope   8/30/2019  1:00 PM Tigist Jewell [facility] SFD   9/4/2019  1:00 PM [place] Foreman [facility] SFD   9/5/2019  1:00 PM [place] Foreman [facility] SFD   9/11/2019  1:00 PM W[place] Foreman [facility] Jose Koenig   9/12/2019 1:00 PM Ynes Salmeron, PT [facility] [place]   9/18/2019  1:00 PM Ynes Salmeron, PT [facility]

## 2019-08-30 ENCOUNTER — HOSPITAL ENCOUNTER (OUTPATIENT)
Dept: PHYSICAL THERAPY | Age: 59
Discharge: HOME | End: 2019-08-30
Payer: COMMERCIAL

## 2019-08-30 PROCEDURE — 97140 MANUAL THERAPY 1/> REGIONS: CPT

## 2019-08-30 PROCEDURE — 97110 THERAPEUTIC EXERCISES: CPT

## 2019-08-30 PROCEDURE — 97014 ELECTRIC STIMULATION THERAPY: CPT

## 2019-08-30 NOTE — PROGRESS NOTES
Shira Calabrese  : 1960  Primary: Nguyễn Hurisdrea  Secondary:  Shira Calabrese  : 1960  Primary: Kusumitz El Katia  Secondary:  [address]  at [facility]  [address], [address] [facility], [place] [address] [address] [facility]  [phone]   [phone]          SEE EVAL FOR DETAILS 2019   OUTPATIENT PHYSICAL THERAPY: Daily Treatment Note 2019  Visit Count:  15    ICD-10: Treatment Diagnosis: Pain in Joint, R shoulder [M25.511]; Incomplete Rotator Cuff Tear, Not Specified as Traumatic [M75.111]; Cervicalgia [M54.2]  Precautions/Allergies:   Patient has no known allergies. TREATMENT PLAN:  Effective Dates: 2019 TO 2019 (90 days). Frequency/Duration:up to  3 times a week for 90 Days   MEDICAL/REFERRING DIAGNOSIS:  Left shoulder pain [M25.512]   DATE OF ONSET: 3/1/19  REFERRING PHYSICIAN: GUI Montes MD Orders: Evaluate and Treat  Return MD Appointment:   Future Appointments   Date Time Provider Ced Hope   2019  1:00 PM Louis Guzman, PT [facility] [place]   2019  [facility]:00 [facility]   2019  1:00 PM Louis Guzman, PT [facility] D   2019  1:00 PM [facility]   2019  1:00 PM Louis Guzman, [facility] SFD          Pre-treatment Symptoms/Complaints:  He reports he did not sleep well last night. He also reports pain is 8.5/10. He stated he attained a . Pain: Initial:    8.5/10 Post Session:  7-7.5/10   Medications Last Reviewed:  2019  Updated Objective Findings:  None Today  TREATMENT:     Therapeutic Exercise: (  25 minutes):  Exercises per grid below to improve mobility and strength. Required moderate verbal and manual cues to promote proper body alignment, promote proper body posture and promote proper body mechanics. Progressed resistance, range and repetitions as indicated.     19       Activity/Exercise         Isometric Abd R shoulder         Isometric Flex R shoulder         Isometric ER R shoulder         Isometric IR R shoulder         Isometric Ext R shoulder         AROM R shoulder flexion Wand   X 10 B Wand   2 x 10 B        AROM R shoulder abduction With wand   X 10 B  Wand   2 x 10 B       UBE Level 1  4/4   8 minutes Level 1.5  5/5   10 min        Lat Pull down         R shoulder IR         R shoulder ER With wand   X 10 B  Wand   2 x 10        Elbow flexion   Wand   2 x 10 B        Press up   Wand   2 x 10  To 90 degrees in supine - no pain per patient        Rows         Manual Therapy (    15 minutes  ): Manual techniques to facilitate improved motion and decreased pain. (Used abbreviations: MET - muscle energy technique; PNF - proprioceptive neuromuscular facilitation; NMR - neuromuscular re-education; a/p - anterior to posterior; p/a - posterior to anterior)   · Joint mobilization: R glenohumeral, inferior glide, grade II  · Soft tissue mobilization: R upper trapezius, levator scapulae, latissimus dorsi, infraspinatus, pec minor      MODALITIES: (15 minutes) Patient supine with R arm supported and legs on wedge for comfort for IFES at level 23 to right upper trap and shoulder with cold pack all to help decrease pain and increase motion. Skin clear and intact. [employer] Portal  Treatment/Session Summary:    · Response to Treatment: Patient reports pain to be 7-7.5 upon departure. He keeps stating, \"It just hurts and they need to fix it\". · Communication/Consultation:  None today  · Equipment provided today:  None today  · Recommendations/Intent for next treatment session: Next visit will focus on pain relief; consider pain free R shoulder ROM and gradual progression of shoulder strengthening. Unattended estim UBE and theraband training  .     Total Treatment Billable Duration: 55 minutes   PT Patient Time In/Time Out  Time In: 1300  Time Out: [address] [facility], [facility]    Future Appointments   Date Time Provider Ced Hope   9/4/2019  1:00 PM Blanka Mock, PT [facility]   9/5/2019  1:00 PM Blanka Mock, PT [facility]   9/11/2019  1:00 PM Blanka Mock, PT [facility]   9/12/2019  1:00 PM Blanka Mock, PT [facility]   9/18/2019  1:00 PM Blanka Mccarty, PT [facility]

## 2019-09-04 ENCOUNTER — HOSPITAL ENCOUNTER (OUTPATIENT)
Dept: PHYSICAL THERAPY | Age: 59
Discharge: HOME | End: 2019-09-04
Payer: COMMERCIAL

## 2019-09-04 PROCEDURE — 97035 APP MDLTY 1+ULTRASOUND EA 15: CPT

## 2019-09-04 PROCEDURE — 97110 THERAPEUTIC EXERCISES: CPT

## 2019-09-04 PROCEDURE — 97140 MANUAL THERAPY 1/> REGIONS: CPT

## 2019-09-04 PROCEDURE — 97014 ELECTRIC STIMULATION THERAPY: CPT

## 2019-09-04 NOTE — PROGRESS NOTES
Darrin Chewty  : 1960  Primary: Marisa Simple [employer]  Secondary:  Darrin Chewty  : 1960  Primary: Marisa Simple [employer]  Secondary:  [address] at [facility]  Vivian [address], [address], [place], [address] [facility]  [phone]   [phone]          SEE EVAL FOR DETAILS 2019   OUTPATIENT PHYSICAL THERAPY: Daily Treatment Note 2019  Visit Count:  16    ICD-10: Treatment Diagnosis: Pain in Joint, R shoulder [M25.511]; Incomplete Rotator Cuff Tear, Not Specified as Traumatic [M75.111]; Cervicalgia [M54.2]  Precautions/Allergies:   Patient has no known allergies. TREATMENT PLAN:  Effective Dates: 2019 TO 2019 (90 days). Frequency/Duration:up to  3 times a week for 90 Days   MEDICAL/REFERRING DIAGNOSIS:  Left shoulder pain [M25.512]   DATE OF ONSET: 3/1/19  REFERRING PHYSICIAN: GUI Cabello MD Orders: Evaluate and Treat  Return MD Appointment:   Future Appointments   Date Time Provider Ced Herminia   2019  1:00 PM Frida Landaverde, [facility]   2019  1:00 PM Frida Landaverde, [facility]   2019  1:00 PM [facility]   2019  1:00 PM Fridaneisha Landaverde, [facility] SFD          Pre-treatment Symptoms/Complaints:  He has retained an . He feels the shoulder is not improved. Pain: Initial:    8.5/10 Post Session:  7-7.5/10   Medications Last Reviewed:  2019  Updated Objective Findings:  None Today  TREATMENT:     Therapeutic Exercise: (  15 minutes):  Exercises per grid below to improve mobility and strength. Required moderate verbal and manual cues to promote proper body alignment, promote proper body posture and promote proper body mechanics. Progressed resistance, range and repetitions as indicated.    2019         Activity/Exercise         Isometric Abd R shoulder         Isometric Flex R shoulder         Isometric ER R shoulder         Isometric IR R shoulder         Isometric Ext R shoulder         AROM R shoulder flexion supine  20 x's         AROM R shoulder abduction 20 x's         UBE         Lat Pull down         R shoulder IR         R shoulder ER         Elbow flexion          Press up          Rows         ER / IR supine rom  20 x's        Manual Therapy (    15 minutes  ): Manual techniques to facilitate improved motion and decreased pain. (Used abbreviations: MET - muscle energy technique; PNF - proprioceptive neuromuscular facilitation; NMR - neuromuscular re-education; a/p - anterior to posterior; p/a - posterior to anterior)   · Joint mobilization: R glenohumeral, inferior glide, grade II  · Soft tissue mobilization: R upper trapezius, levator scapulae, latissimus dorsi, infraspinatus, pec minor      MODALITIES: (15 minutes) Patient supine with R arm supported and legs on wedge for comfort for IFES at level 23 to right upper trap and shoulder with cold pack all to help decrease pain and increase motion. Skin clear and intact. Ultrasound 8 mins 1.8 w/cm 2 to the right anterior shoulder GHJ  In supine with LE's. Elevated to increase blood flow and mobility of the shoulder. [employer] Portal  Treatment/Session Summary:    · Response to Treatment: Patient reports pain to be 8 upon departure. He states, \"It just hurts and they need to fix it\". PT sent email to nurse  to discuss current findings and limited progression. · Communication/Consultation:  None today  · Equipment provided today:  None today  · Recommendations/Intent for next treatment session: Next visit will focus on pain relief; consider pain free R shoulder ROM and gradual progression of shoulder strengthening. Unattended estim UBE and theraband training  .     Total Treatment Billable Duration: 55 minutes   PT Patient Time In/Time Out  Time In: 1300  Time Out: [address], [place]    Future Appointments   Date Time Provider Ced Hope 9/5/2019  1:00 PM Refugia Harini, PT [facility] D   9/11/2019  1:00 PM [facility]   9/12/2019  1:00 PM Refugia Harini, PT [facility]   9/18/2019  1:00 PM Refugia Harini, PT [facility]

## 2019-09-05 ENCOUNTER — HOSPITAL ENCOUNTER (OUTPATIENT)
Dept: PHYSICAL THERAPY | Age: 59
Discharge: HOME | End: 2019-09-05
Payer: COMMERCIAL

## 2019-09-05 PROCEDURE — 97110 THERAPEUTIC EXERCISES: CPT

## 2019-09-05 PROCEDURE — 97140 MANUAL THERAPY 1/> REGIONS: CPT

## 2019-09-05 PROCEDURE — 97035 APP MDLTY 1+ULTRASOUND EA 15: CPT

## 2019-09-05 PROCEDURE — 97014 ELECTRIC STIMULATION THERAPY: CPT

## 2019-09-05 NOTE — PROGRESS NOTES
Denita Lees  : 1960  Primary: Adrianna Hurisdrea  Secondary:  Denita Lees  : 1960  Primary: Adrianna Sena Katia  Secondary:  [address]  at [facility]  [address] [facility], [place] [address] [address] [facility]  [phone]   [phone]          SEE EVAL FOR DETAILS 2019   OUTPATIENT PHYSICAL THERAPY: Daily Treatment Note 2019  Visit Count:  17    ICD-10: Treatment Diagnosis: Pain in Joint, R shoulder [M25.511]; Incomplete Rotator Cuff Tear, Not Specified as Traumatic [M75.111]; Cervicalgia [M54.2]  Precautions/Allergies:   Patient has no known allergies. TREATMENT PLAN:  Effective Dates: 2019 TO 2019 (90 days). Frequency/Duration:up to  3 times a week for 90 Days   MEDICAL/REFERRING DIAGNOSIS:  Left shoulder pain [M25.512]   DATE OF ONSET: 3/1/19  REFERRING PHYSICIAN: GUI Yoder MD Orders: Evaluate and Treat  Return MD Appointment:   Future Appointments   Date Time Provider Ced Hope   2019  1:00 PM Karen Granda, PT [facility]   2019  1:00 PM Karen Granda, PT [facility] S[facility]   2019  1:00 PM Karen Granda, PT [facility] S[facility]          Pre-treatment Symptoms/Complaints: No improvement per patient . PT contacted  via phone call and left message to please return call. Pain: Initial:    8.5/10 Post Session:  7-7.5/10   Medications Last Reviewed:  2019  Updated Objective Findings:  None Today  TREATMENT:     Therapeutic Exercise: (  15 minutes):  Exercises per grid below to improve mobility and strength. Required moderate verbal and manual cues to promote proper body alignment, promote proper body posture and promote proper body mechanics. Progressed resistance, range and repetitions as indicated.    2019         Activity/Exercise         Isometric Abd R shoulder         Isometric Flex R shoulder         Isometric ER R shoulder         Isometric IR R shoulder Isometric Ext R shoulder         AROM R shoulder flexion supine  20 x's         AROM R shoulder abduction 20 x's         UBE         Lat Pull down         R shoulder IR         R shoulder ER         Elbow flexion          Press up          Rows         ER / IR supine rom  20 x's        Manual Therapy (    15 minutes  ): Manual techniques to facilitate improved motion and decreased pain. (Used abbreviations: MET - muscle energy technique; PNF - proprioceptive neuromuscular facilitation; NMR - neuromuscular re-education; a/p - anterior to posterior; p/a - posterior to anterior)   · Joint mobilization: R glenohumeral, inferior glide, grade II  · Soft tissue mobilization: R upper trapezius, levator scapulae, latissimus dorsi, infraspinatus, pec minor      MODALITIES: (15 minutes) Patient supine with R arm supported and legs on wedge for comfort for IFES at level 23 to right upper trap and shoulder with cold pack all to help decrease pain and increase motion. Skin clear and intact. Ultrasound 8 mins 1.8 w/cm 2 to the right anterior shoulder GHJ  In supine with LE's. Elevated to increase blood flow and mobility of the shoulder. [facility] Portal  Treatment/Session Summary:    · Response to Treatment: Patient reports pain to be virtually the same upon departure. Awaiting response from patient nurse     Communication/Consultation:  None today  · Equipment provided today:  None today  · Recommendations/Intent for next treatment session: Next visit will focus on pain relief; consider pain free R shoulder ROM and gradual progression of shoulder strengthening. Unattended estim UBE and theraband training  .     Total Treatment Billable Duration: 53 minutes   PT Patient Time In/Time Out  Time In: 1300  Time Out: Sugey Davies [address] [place] [address]    Future Appointments   Date Time Provider Cde Hope   9/11/2019  1:00 PM Karen Granda, PT [facility]   9/12/2019  1:00 PM Ileana Meigs Onesimo, PT [facility] S[facility]   9/18/2019  1:00 PM Joe Carranza, PT [facility] Fernando Cheatham

## 2019-09-11 ENCOUNTER — APPOINTMENT (OUTPATIENT)
Dept: PHYSICAL THERAPY | Age: 59
End: 2019-09-11
Payer: COMMERCIAL

## 2019-09-12 ENCOUNTER — APPOINTMENT (OUTPATIENT)
Dept: PHYSICAL THERAPY | Age: 59
End: 2019-09-12
Payer: COMMERCIAL

## 2019-09-18 ENCOUNTER — HOSPITAL ENCOUNTER (OUTPATIENT)
Dept: PHYSICAL THERAPY | Age: 59
Discharge: HOME | End: 2019-09-18
Payer: COMMERCIAL

## 2019-09-18 PROCEDURE — 97014 ELECTRIC STIMULATION THERAPY: CPT

## 2019-09-18 PROCEDURE — 97110 THERAPEUTIC EXERCISES: CPT

## 2019-09-18 NOTE — PROGRESS NOTES
Laura Lang  : 1960  Primary: Floyd Montalvo  Secondary:  Laura Lang  : 1960  Primary: Floyd Montalvo  Secondary:  [address]  at [facility]  [address], [address] [facility], [place] [address] [address] [facility]  [phone]   [phone]          SEE EVAL FOR DETAILS 2019   OUTPATIENT PHYSICAL THERAPY: Daily Treatment Note 2019  Visit Count:  18    ICD-10: Treatment Diagnosis: Pain in Joint, R shoulder [M25.511]; Incomplete Rotator Cuff Tear, Not Specified as Traumatic [M75.111]; Cervicalgia [M54.2]  Precautions/Allergies:   Patient has no known allergies. TREATMENT PLAN:  Effective Dates: 2019 TO 2019 (90 days). Frequency/Duration:up to  3 times a week for 90 Days   MEDICAL/REFERRING DIAGNOSIS:  Left shoulder pain [M25.512]   DATE OF ONSET: 3/1/19  REFERRING PHYSICIAN: GUI Meyer MD Orders: Evaluate and Treat  Return MD Appointment:   Future Appointments   Date Time Provider Ced Hope   2019  3:15 PM Arie Garay, RIVERA [facility] S[facility]   10/2/2019  1:00 PM Arie Garay PT [facility] SFD          Pre-treatment Symptoms/Complaints: No improvement per patient . PT contacted MD OFFICE of [facility] regarding further medical and imaging evaluation. Pain: Initial:    8.5/10 Post Session:  8.5/10   Medications Last Reviewed:  2019  Updated Objective Findings:  None Today  TREATMENT:     Therapeutic Exercise: (  8 minutes):  Exercises per grid below to improve mobility and strength. Required moderate verbal and manual cues to promote proper body alignment, promote proper body posture and promote proper body mechanics. Progressed resistance, range and repetitions as indicated.    2019         Activity/Exercise         Isometric Abd R shoulder         Isometric Flex R shoulder         Isometric ER R shoulder         Isometric IR R shoulder         Isometric Ext R shoulder         AROM R shoulder flexion supine  10 x's         AROM R shoulder abduction 10 x's         UBE         Lat Pull down         R shoulder IR         R shoulder ER         Elbow flexion          Press up          Rows         ER / IR supine rom  10  x's        Manual Therapy (    15 minutes  ): Manual techniques to facilitate improved motion and decreased pain. (Used abbreviations: MET - muscle energy technique; PNF - proprioceptive neuromuscular facilitation; NMR - neuromuscular re-education; a/p - anterior to posterior; p/a - posterior to anterior)   · Joint mobilization: R glenohumeral, inferior glide, grade II  · Soft tissue mobilization: R upper trapezius, levator scapulae, latissimus dorsi, infraspinatus, pec minor      MODALITIES: (15 minutes) Patient supine with R arm supported and legs on wedge for comfort for IFES at level 23 to right upper trap and shoulder with cold pack all to help decrease pain and increase motion. Skin clear and intact. [facility] Portal  Treatment/Session Summary:    · Response to Treatment: Patient reports pain to be virtually the same upon departure. Awaiting response from MD OFFICE. Communication/Consultation:  MD office. · Equipment provided today:  None today  Recommendations/Intent for next treatment session: Next visit will focus on pain relief; consider pain free R shoulder ROM and gradual progression of shoulder strengthening. Pain modulation primarily .      Total Treatment Billable Duration: 23 minutes   PT Patient Time In/Time Out  Time In: 1300  Time Out: [address]    Future Appointments   Date Time Provider Ced Hope   9/19/2019  3:15 PM Arie Garay PT [facility]   10/2/2019  1:00 PM Arie Garay PT [facility]

## 2019-09-19 ENCOUNTER — HOSPITAL ENCOUNTER (OUTPATIENT)
Dept: PHYSICAL THERAPY | Age: 59
Discharge: HOME | End: 2019-09-19
Payer: COMMERCIAL

## 2019-09-19 PROCEDURE — 97014 ELECTRIC STIMULATION THERAPY: CPT

## 2019-09-19 PROCEDURE — 97110 THERAPEUTIC EXERCISES: CPT

## 2019-09-19 NOTE — PROGRESS NOTES
Morro Ellison  : 1960  Primary: Ella Husarahdrea  Secondary:  Morro Ellison  : 1960  Primary: Ella Montalvo  Secondary:  [address]  at [facility]  [address], [address], [facility] [address] [facility]  [phone]   [phone]          SEE EVAL FOR DETAILS 2019   OUTPATIENT PHYSICAL THERAPY: Daily Treatment Note 2019  Visit Count:  19    ICD-10: Treatment Diagnosis: Pain in Joint, R shoulder [M25.511]; Incomplete Rotator Cuff Tear, Not Specified as Traumatic [M75.111]; Cervicalgia [M54.2]  Precautions/Allergies:   Patient has no known allergies. TREATMENT PLAN:  Effective Dates: 2019 TO 2019 (90 days). Frequency/Duration:up to  3 times a week for 90 Days   MEDICAL/REFERRING DIAGNOSIS:  Left shoulder pain [M25.512]   DATE OF ONSET: 3/1/19  REFERRING PHYSICIAN: GUI Bosch MD Orders: Evaluate and Treat  Return MD Appointment:   Future Appointments   Date Time Provider Ced Herminia   10/2/2019  1:00 PM Sebastian Pinto PT [facility] S[facility]          Pre-treatment Symptoms/Complaints: No improvement per patient . PT contacted MD OFFICE of [facility] regarding further medical and imaging evaluation. Pain: Initial:    8.5/10 Post Session:  8.5/10   Medications Last Reviewed:  2019  Updated Objective Findings:  None Today  TREATMENT:     Therapeutic Exercise: (  25 minutes):  Exercises per grid below to improve mobility and strength. Required moderate verbal and manual cues to promote proper body alignment, promote proper body posture and promote proper body mechanics. Progressed resistance, range and repetitions as indicated. 2019         Activity/Exercise         UBE  8 mins total 4 forward and 4 backwards         Postural reeducation  2 mins        Supine right shoulder with pillow under the arm to rest  Shoulder in mod neutral position.    15 mins Manual Therapy (    0 minutes  ): Manual techniques to facilitate improved motion and decreased pain. (Used abbreviations: MET - muscle energy technique; PNF - proprioceptive neuromuscular facilitation; NMR - neuromuscular re-education; a/p - anterior to posterior; p/a - posterior to anterior)   · Joint mobilization: R glenohumeral, inferior glide, grade II  · Soft tissue mobilization: R upper trapezius, levator scapulae, latissimus dorsi, infraspinatus, pec minor      MODALITIES: (20 minutes) Patient supine with R arm supported and legs on wedge for comfort for IFES at level 23 to right upper trap and shoulder with cold pack all to help decrease pain and increase motion concurrent with therapeutic exercise reeducation in mod neutral position. . Skin clear and intact. [facility] Portal  Treatment/Session Summary:    · Response to Treatment: Patient reports pain to be virtually the same upon departure. No progression of therapeutic exercise secondary to provocation and mechanical irritation in the shoulder. ·   Communication/Consultation:  MD office states is working to find way forward with worker comp insurance for shoulder replacement. · Equipment provided today:  None today  Recommendations/Intent for next treatment session: Next visit will focus on pain relief; pain modulation primarily .      Total Treatment Billable Duration: 40 minutes   PT Patient Time In/Time Out  Time In: 9306  Time [facility]: [facility] [place]    Future Appointments   Date Time Provider Ced Hope   10/2/2019  1:00 PM Sebastian Pinto PT [facility]

## 2019-10-02 ENCOUNTER — HOSPITAL ENCOUNTER (OUTPATIENT)
Dept: PHYSICAL THERAPY | Age: 59
Discharge: HOME | End: 2019-10-02
Payer: COMMERCIAL

## 2019-10-02 PROCEDURE — 97110 THERAPEUTIC EXERCISES: CPT

## 2019-10-02 PROCEDURE — 97014 ELECTRIC STIMULATION THERAPY: CPT

## 2019-10-02 NOTE — THERAPY DISCHARGE
Ryan Amador  : 1960  Primary: Ely Tan [employer]risk  Secondary:  [address] at [address]  [phone]   [phone]        OUTPATIENT PHYSICAL THERAPY:Initial Assessment 10/2/2019   ICD-10: Treatment Diagnosis: Pain in Joint, R shoulder [M25.511]; Incomplete Rotator Cuff Tear, Not Specified as Traumatic [M75.111]; Cervicalgia [M54.2]  Precautions/Allergies:   Patient has no known allergies. TREATMENT PLAN:  Effective Dates: 2019 TO 2019 (90 days). Frequency/Duration:up to 3 times a week for 90 Days   MEDICAL/REFERRING DIAGNOSIS:  Primary osteoarthritis, right shoulder [M19.011]   DATE OF ONSET: 3/1/19  REFERRING PHYSICIAN: GUI Ellington MD Orders: Evaluate and Treat  Return MD Appointment:   No future appointments. INITIAL ASSESSMENT:  Mr. Eduin Mcfarland presents with continued signs and symptoms of a R shoulder rotator cuff derangement. Pt. Presents with moderate-maximal R shoulder pain, ROM deficits, weakness, and muscular guarding in the R upper trapezius, pectoralis, and latissimus dorsi musculature. PT is unable to test external rotation lag sign and drop arm test secondary to pain at initial evaluation. Secondary complaints include neck pain with likely guarding and compensation due to chronicity of current condition. He has received shoulder injections for pain. He reports shoulder surgery is anticipated for the condition. He has not progressed at this time and will now be discharged with return to further orthopedic evaluation planned. GOALS: (Goals have been discussed and agreed upon with patient.)  Discharge Goals: Time Frame: goals not met 10/2/2019   1. Pt. Will demonstrate < 6/10 R shoulder pain to improve sleep at night. 2. Pt. Will demonstrate >110 degrees of R shoulder flexion to improve overhead activities at work. 3. Pt.  Will demonstrate 5/5 shoulder flexion to improve work lifting activities. 4. Pt. Will score 30 on the quick Disabilities of the Shoulder and Hand (DASH) questionnaire to demonstrate improved pain and function. OUTCOME MEASURE:   Tool Used: Disabilities of the Arm, Shoulder and Hand (DASH) Questionnaire - Quick Version  Score:  Initial: 47/55  Most Recent: 46/55 (Date: 10/2/2019 )   Interpretation of Score: The DASH is designed to measure the activities of daily living in person's with upper extremity dysfunction or pain. Each section is scored on a 1-5 scale, 5 representing the greatest disability. The scores of each section are added together for a total score of 55. ·   REASON FOR SERVICES/OTHER COMMENTS:   Patient will now be discontinued  from Physical Therapy with a HEP, goals and most recent objective data have been addressed and reported in the discontinuatio summary, which has been sent to MD before patient's next MD visit. Total Duration:  PT Patient Time In/Time Out  Time In: 1300  Time Out: 1340    Rehabilitation Potential For Stated Goals: Fair  Regarding Garret Hamilton's therapy, I certify that the treatment plan above will be carried out by a therapist or under their direction. Thank you for this referral,  Edenilson Mitchell, PT          PAIN/SUBJECTIVE:   Initial:   8.5/10 right shoulder  Post Session:  8.5/10 right shoulder    HISTORY:   History of Injury/Illness (Reason for Referral):  Pt. Attends PT c/o R shoulder pain since March 1st after lifting a heavy table at work. Pt. Notes the pain was initially managed with injections and he was put in a sling for 1 month to address pain. Pt. Then consulted an orthopedic physician and MRI diagnosed large rotator cuff tear in the R shoulder. Per patient, moderate glenohumeral arthritis is also present and physician recommended a total shoulder replacement. Pt. Would prefer not to have total shoulder replacement and seeks alternative intervention at this time.   Currently, pt. Is not able to raise arm without pain and is not able to work. Pt. Works maintenance at [employer], climbing ladders, and carrying various objects. Secondary complaints include right neck pain. Past Medical History/Comorbidities:   Mr. Morales  has a past medical history of Drug addiction ([facility] [address] and Rotator cuff tear. He also has no past medical history of Aneurysm ([address] Arrhythmia, Arthritis, Asthma, Autoimmune disease ([address] CAD (coronary artery disease), Cancer ([address] Chronic kidney disease, Chronic pain, Coagulation defects, COPD, Diabetes ([address] GERD (gastroesophageal reflux disease), Heart failure ([address] Hypertension, Liver disease, Psychiatric disorder, PUD (peptic ulcer disease), Seizures ([address] Stroke ([address] Thromboembolus ([address] or Thyroid disease. Mr. Morales  has a past surgical history that includes hx knee arthroscopy and pr upper arm/elbow surgery unlisted. L rotator cuff repair 2011  Social History/Living Environment:     lives in a single story home with family  Prior Level of Function/Work/Activity:  Functioned independently without limitations     Ambulatory/Rehab Services H2 Model Falls Risk Assessment   Risk Factors:       (1)  Gender [Male] Ability to Rise from Chair:       (0)  Ability to rise in a single movement   Falls Prevention Plan:       No modifications necessary   Total: (5 or greater = High Risk): 1   ©2010 [facility] of [employer]. All Rights Reserved. [facility] States Patent #[id number]. Federal Law prohibits the replication, distribution or use without written permission from [facility] [employer]   Current Medications:     No current outpatient medications on file.    Date Last Reviewed:  10/2/2019   Number of Personal Factors/Comorbidities that affect the Plan of Care: 1-2: MODERATE COMPLEXITY   EXAMINATION:    ROM:       RIGHT LEFT    Shoulder flexion  80 AROM (pain)  162    Shoulder external rotation  80 PROM (pain)  79    Shoulder internal rotation scratch Greater trochancter (pain)  35          Strength:       RIGHT  LEFT     Shoulder flexion  2+/5 (pain)  5/5    Shoulder ER  3+/5 (pain) 5/5     Shoulder IR 2+/5 (pain) 5/5    Scapular retraction  4/5 5/5           Flexibility:       RIGHT LEFT    Pectoralis Major/Minor  limited limited     Latissimus Dorsi  -- --     Upper trapezius  --  --          Special Testing       RIGHT LEFT    IR lag sign (+)       ER lag sign Unable to test secondary to pain and apprehension      Drop arm Unable to test secondary to pain and apprehension       Spurlings (-) pain in shoulder blade   (-)          Joint Mobility:        RIGHT LEFT       --  --                    Body Structures Involved:  1. Joints  2. Muscles Body Functions Affected:  1. Sensory/Pain  2. Neuromusculoskeletal  3. Movement Related Activities and Participation Affected:  1. General Tasks and Demands  2. Mobility  3. Self Care  4.  Community, Social and [place]   Number of elements (examined above) that affect the Plan of Care: 3: MODERATE COMPLEXITY   CLINICAL PRESENTATION:   Presentation: Evolving clinical presentation with changing clinical characteristics: MODERATE COMPLEXITY   CLINICAL DECISION MAKING:   Use of outcome tool(s) and clinical judgement create a POC that gives a: Questionable prediction of patient's progress: MODERATE COMPLEXITY

## 2019-10-02 NOTE — PROGRESS NOTES
Jaren Sep  : 1960  Primary: Hayden Hurisdrea  Secondary:  Jaren Sep  : 1960  Primary: Hayden Jayda Katia  Secondary:  [address]  at [facility]  [address], [address] [facility], [address]  [phone]   [phone]          DISCHARGE   OUTPATIENT PHYSICAL THERAPY: Daily Treatment Note 10/2/2019  Visit Count:  20    ICD-10: Treatment Diagnosis: Pain in Joint, R shoulder [M25.511]; Incomplete Rotator Cuff Tear, Not Specified as Traumatic [M75.111]; Cervicalgia [M54.2]  Precautions/Allergies:   Patient has no known allergies. TREATMENT PLAN:  Effective Dates: 2019 TO 2019 (90 days). Frequency/Duration:up to  3 times a week for 90 Days   MEDICAL/REFERRING DIAGNOSIS:  Left shoulder pain [M25.512]   DATE OF ONSET: 3/1/19  REFERRING PHYSICIAN: GUI Balbuena MD Orders: Evaluate and Treat  Return MD Appointment:   No future appointments. Pre-treatment Symptoms/Complaints: No improvement per patient . PT contacted MD OFFICE of [facility] regarding further medical and imaging evaluation last week. Final PT visit today. Pain: Initial:    8.5/10 right shoulder. Post Session:  8.5/10 right shoulder. Medications Last Reviewed:  10/2/2019  Updated Objective Findings:  None Today  TREATMENT:     Therapeutic Exercise: (  25 minutes):  Exercises per grid below to improve mobility and strength. Required moderate verbal and manual cues to promote proper body alignment, promote proper body posture and promote proper body mechanics. Progressed resistance, range and repetitions as indicated. Neutral Shoulder positioning concurrent with estim modalities for pain control.         MODALITIES: (25 minutes) Patient supine with R arm supported and legs on wedge for comfort for IFES at level 23 to right upper trap and shoulder with cold pack all to help decrease pain and increase motion concurrent with therapeutic exercise reeducation in mod neutral position. . Skin clear and intact. [employer] Portal  Treatment/Session Summary:    · Response to Treatment: Patient reports pain to be virtually the same upon departure. No progression of therapeutic exercise secondary to provocation and mechanical irritation in the shoulder. ·   Communication/Consultation:  MD office states is working to find way forward with worker comp insurance for shoulder replacement. · Equipment provided today:  None today  Recommendations/Intent for next treatment session: No additional PT at this time return to [facility] surgery. Total Treatment Billable Duration: 25 minutes and discharge assessment. PT Patient Time In/Time Out  Time In: 1300  Time Out: 9 Sugey [place]    No future appointments.

## 2020-01-03 ENCOUNTER — HOSPITAL ENCOUNTER (OUTPATIENT)
Dept: SURGERY | Age: 60
Discharge: HOME | End: 2020-01-03
Payer: OTHER MISCELLANEOUS

## 2020-01-03 VITALS
TEMPERATURE: 97.1 F | OXYGEN SATURATION: 98 % | RESPIRATION RATE: 16 BRPM | HEIGHT: 69 IN | DIASTOLIC BLOOD PRESSURE: 96 MMHG | WEIGHT: 176 LBS | SYSTOLIC BLOOD PRESSURE: 159 MMHG | BODY MASS INDEX: 26.07 KG/M2 | HEART RATE: 59 BPM

## 2020-01-03 LAB
ALBUMIN SERPL-MCNC: 3.7 G/DL (ref 3.5–5)
ALBUMIN/GLOB SERPL: 0.9 {RATIO} (ref 1.2–3.5)
ALP SERPL-CCNC: 92 U/L (ref 50–136)
ALT SERPL-CCNC: 20 U/L (ref 12–65)
ANION GAP SERPL CALC-SCNC: 3 MMOL/L (ref 7–16)
APPEARANCE UR: CLEAR
APTT PPP: 30 SEC (ref 24.7–39.8)
AST SERPL-CCNC: 19 U/L (ref 15–37)
BACTERIA SPEC CULT: NORMAL
BILIRUB SERPL-MCNC: 0.6 MG/DL (ref 0.2–1.1)
BILIRUB UR QL: NEGATIVE
BUN SERPL-MCNC: 13 MG/DL (ref 6–23)
CALCIUM SERPL-MCNC: 9 MG/DL (ref 8.3–10.4)
CHLORIDE SERPL-SCNC: 107 MMOL/L (ref 98–107)
CO2 SERPL-SCNC: 27 MMOL/L (ref 21–32)
COLOR UR: YELLOW
CREAT SERPL-MCNC: 1.01 MG/DL (ref 0.8–1.5)
ERYTHROCYTE [DISTWIDTH] IN BLOOD BY AUTOMATED COUNT: 13.2 % (ref 11.9–14.6)
GLOBULIN SER CALC-MCNC: 4 G/DL (ref 2.3–3.5)
GLUCOSE SERPL-MCNC: 93 MG/DL (ref 65–100)
GLUCOSE UR STRIP.AUTO-MCNC: NEGATIVE MG/DL
HCT VFR BLD AUTO: 43 % (ref 41.1–50.3)
HGB BLD-MCNC: 14.3 G/DL (ref 13.6–17.2)
HGB UR QL STRIP: NEGATIVE
INR PPP: 1
KETONES UR QL STRIP.AUTO: NEGATIVE MG/DL
LEUKOCYTE ESTERASE UR QL STRIP.AUTO: NEGATIVE
MAGNESIUM SERPL-MCNC: 2 MG/DL (ref 1.8–2.4)
MCH RBC QN AUTO: 29.8 PG (ref 26.1–32.9)
MCHC RBC AUTO-ENTMCNC: 33.3 G/DL (ref 31.4–35)
MCV RBC AUTO: 89.6 FL (ref 79.6–97.8)
NITRITE UR QL STRIP.AUTO: NEGATIVE
NRBC # BLD: 0 K/UL (ref 0–0.2)
PH UR STRIP: 6 [PH] (ref 5–9)
PLATELET # BLD AUTO: 170 K/UL (ref 150–450)
PMV BLD AUTO: 11.5 FL (ref 9.4–12.3)
POTASSIUM SERPL-SCNC: 4 MMOL/L (ref 3.5–5.1)
PROT SERPL-MCNC: 7.7 G/DL (ref 6.3–8.2)
PROT UR STRIP-MCNC: NEGATIVE MG/DL
PROTHROMBIN TIME: 13.2 SEC (ref 11.7–14.5)
RBC # BLD AUTO: 4.8 M/UL (ref 4.23–5.6)
SERVICE CMNT-IMP: NORMAL
SODIUM SERPL-SCNC: 137 MMOL/L (ref 136–145)
SP GR UR REFRACTOMETRY: 1.01 (ref 1–1.02)
UROBILINOGEN UR QL STRIP.AUTO: 0.2 EU/DL (ref 0.2–1)
WBC # BLD AUTO: 8.7 K/UL (ref 4.3–11.1)

## 2020-01-03 PROCEDURE — 87641 MR-STAPH DNA AMP PROBE: CPT

## 2020-01-03 PROCEDURE — 81003 URINALYSIS AUTO W/O SCOPE: CPT

## 2020-01-03 PROCEDURE — 85610 PROTHROMBIN TIME: CPT

## 2020-01-03 PROCEDURE — 80053 COMPREHEN METABOLIC PANEL: CPT

## 2020-01-03 PROCEDURE — 83735 ASSAY OF MAGNESIUM: CPT

## 2020-01-03 PROCEDURE — 85730 THROMBOPLASTIN TIME PARTIAL: CPT

## 2020-01-03 PROCEDURE — 85027 COMPLETE CBC AUTOMATED: CPT

## 2020-01-03 RX ORDER — IBUPROFEN 800 MG/1
800 TABLET ORAL
COMMUNITY
End: 2020-05-01

## 2020-01-03 NOTE — PERIOP NOTES
Labs done today within [facility] protocols    Recent Results (from the past 12 hour(s))   CBC W/O DIFF    Collection Time: 01/03/20 12:37 PM   Result Value Ref Range    WBC 8.7 4.3 - 11.1 K/uL    RBC 4.80 4.23 - 5.6 M/uL    HGB 14.3 13.6 - 17.2 g/dL    HCT 43.0 41.1 - 50.3 %    MCV 89.6 79.6 - 97.8 FL    MCH 29.8 26.1 - 32.9 PG    MCHC 33.3 31.4 - 35.0 g/dL    RDW 13.2 11.9 - 14.6 %    PLATELET 530 142 - 630 K/uL    MPV 11.5 9.4 - 12.3 FL    ABSOLUTE NRBC 0.00 0.0 - 0.2 K/uL   MAGNESIUM    Collection Time: 01/03/20 12:37 PM   Result Value Ref Range    Magnesium 2.0 1.8 - 2.4 mg/dL   METABOLIC PANEL, COMPREHENSIVE    Collection Time: 01/03/20 12:37 PM   Result Value Ref Range    Sodium 137 136 - 145 mmol/L    Potassium 4.0 3.5 - 5.1 mmol/L    Chloride 107 98 - 107 mmol/L    CO2 27 21 - 32 mmol/L    Anion gap 3 (L) 7 - 16 mmol/L    Glucose 93 65 - 100 mg/dL    BUN 13 6 - 23 MG/DL    Creatinine 1.01 0.8 - 1.5 MG/DL    GFR est AA >60 >60 ml/min/1.73m2    GFR est non-AA >60 >60 ml/min/1.73m2    Calcium 9.0 8.3 - 10.4 MG/DL    Bilirubin, total 0.6 0.2 - 1.1 MG/DL    ALT (SGPT) 20 12 - 65 U/L    AST (SGOT) 19 15 - 37 U/L    Alk.  phosphatase 92 50 - 136 U/L    Protein, total 7.7 6.3 - 8.2 g/dL    Albumin 3.7 3.5 - 5.0 g/dL    Globulin 4.0 (H) 2.3 - 3.5 g/dL    A-G Ratio 0.9 (L) 1.2 - 3.5     PROTHROMBIN TIME + INR    Collection Time: 01/03/20 12:37 PM   Result Value Ref Range    Prothrombin time 13.2 11.7 - 14.5 sec    INR 1.0     PTT    Collection Time: 01/03/20 12:37 PM   Result Value Ref Range    aPTT 30.0 24.7 - 39.8 SEC   URINALYSIS W/ RFLX MICROSCOPIC    Collection Time: 01/03/20 12:37 PM   Result Value Ref Range    Color YELLOW      Appearance CLEAR      Specific gravity 1.006 1.001 - 1.023      pH (UA) 6.0 5.0 - 9.0      Protein NEGATIVE  NEG mg/dL    Glucose NEGATIVE  mg/dL    Ketone NEGATIVE  NEG mg/dL    Bilirubin NEGATIVE  NEG      Blood NEGATIVE  NEG      Urobilinogen 0.2 0.2 - 1.0 EU/dL    Nitrites NEGATIVE  NEG Leukocyte Esterase NEGATIVE  NEG

## 2020-01-03 NOTE — PERIOP NOTES
PLEASE CONTINUE TAKING ALL PRESCRIPTION MEDICATIONS UP TO THE DAY OF SURGERY UNLESS OTHERWISE DIRECTED BELOW. DISCONTINUE all vitamins and supplements 7 days prior to surgery. DISCONTINUE Non-Steriodal Anti-Inflammatory (NSAIDS) such as Advil and Aleve 5 days prior to surgery. Home Medications to take  the day of surgery      Tylenol if needed           Home Medications   to Hold           Comments                Please do not bring home medications with you on the day of surgery unless otherwise directed by your nurse. If you are instructed to bring home medications, please give them to your nurse as they will be administered by the nursing staff. If you have any questions, please call [facility] [phone]. Copy of this document given to patient.

## 2020-01-03 NOTE — PERIOP NOTES
Patient verified name and . Order for consent is found in EHR and matches case posting; patient verified. Type 3 surgery, Walk in assessment complete. Labs per surgeon: CBC,CMP, PT/PTT, MAG, UA, MRSA SWAB ; results (processing)  Labs per anesthesia protocol: no additional  EKG: not needed per [facility] protocols. Most recent dated 10/10/19 requested via faxed request from PCP = Pamela Baig MD.    MRSA/MSSA swab collected; pharmacy to review and dose antibiotic as appropriate. Hospital approved surgical skin cleanser and instructions to return bottle on DOS given per hospital policy. Patient provided with handouts including Guide to Surgery, Pain Management, Hand Hygiene, Blood Transfusion Education, and [facility] Brochure. Patient answered medical/surgical history questions at their best of ability. All prior to admission medications documented in [facility]. Original medication prescription bottle not visualized during patient appointment. Patient instructed to hold all vitamins 3 weeks prior to surgery and NSAIDS 5 days prior to surgery. Patient teach back successful and patient demonstrates knowledge of instruction.

## 2020-01-05 PROBLEM — S46.111A STRAIN OF MUSCLE, FASCIA AND TENDON OF LONG HEAD OF BICEPS, RIGHT ARM, INITIAL ENCOUNTER: Status: ACTIVE | Noted: 2020-01-05

## 2020-01-05 PROBLEM — S46.011A TRAUMATIC TEAR OF RIGHT ROTATOR CUFF: Status: ACTIVE | Noted: 2020-01-05

## 2020-01-05 PROBLEM — S43.431A SUPERIOR GLENOID LABRUM LESION OF RIGHT SHOULDER: Status: ACTIVE | Noted: 2020-01-05

## 2020-01-05 PROBLEM — M19.011 OSTEOARTHRITIS OF RIGHT GLENOHUMERAL JOINT: Status: ACTIVE | Noted: 2020-01-05

## 2020-01-05 NOTE — H&P
[facility] HISTORY AND PHYSICAL    Subjective:     Patient is a 64 y.o. LHD MALE WITH RIGHT SHOULDER PAIN. SEE OFFICE NOTE. Patient Active Problem List    Diagnosis Date Noted    Traumatic tear of right rotator cuff 01/05/2020    Strain of muscle, fascia and tendon of long head of biceps, right arm, initial encounter 01/05/2020    Superior glenoid labrum lesion of right shoulder 01/05/2020    Osteoarthritis of right glenohumeral joint 01/05/2020     Past Medical History:   Diagnosis Date    Drug addiction [facility])     history of; drug free for 13 years.  Eczema     Hiatal hernia     Kidney stones     Osteoarthritis of right glenohumeral joint 1/5/2020    Rotator cuff tear     left      Past Surgical History:   Procedure Laterality Date    HX KNEE ARTHROSCOPY Right     HX ORTHOPAEDIC Left 1990's    tendon release - due to injury    UPPER ARM/ELBOW SURGERY UNLISTED Left     fx repair      Prior to Admission medications    Medication Sig Start Date End Date Taking? Authorizing Provider   ibuprofen (MOTRIN) 800 mg tablet Take 800 mg by mouth every eight (8) hours as needed for Pain. Provider, Historical   acetaminophen (TYLENOL PO) Take  by mouth as needed. Provider, Historical     No Known Allergies   Social History     Tobacco Use    Smoking status: Former Smoker     Last attempt to quit: 3/1/2009     Years since quitting: 10.8    Smokeless tobacco: Never Used   Substance Use Topics    Alcohol use: No      No family history on file. Review of Systems  A comprehensive review of systems was negative except for that written in the HPI. Objective:     No data found. Visit Vitals  Ht 5' 9\" (1.753 m)   Wt 81.6 kg (180 lb)   BMI 26.58 kg/m²     General:  Alert, cooperative, no distress, appears stated age. Head:  Normocephalic, without obvious abnormality, atraumatic. Back:   Symmetric, no curvature. ROM normal. No CVA tenderness.    Lungs:   Clear to auscultation bilaterally. Chest wall:  No tenderness or deformity. Heart:  Regular rate and rhythm, S1, S2 normal, no murmur, click, rub or gallop. Extremities: Extremities normal, atraumatic, no cyanosis or edema. Pulses: 2+ and symmetric all extremities. Skin: Skin color, texture, turgor normal. No rashes or lesions   Lymph nodes: Cervical, supraclavicular, and axillary nodes normal.   Neurologic: CNII-XII intact. Normal strength, sensation and reflexes throughout. Assessment:     Principal Problem:    Osteoarthritis of right glenohumeral joint (1/5/2020)    Active Problems:    Traumatic tear of right rotator cuff (1/5/2020)      Strain of muscle, fascia and tendon of long head of biceps, right arm, initial encounter (1/5/2020)      Superior glenoid labrum lesion of right shoulder (1/5/2020)        Plan:     The various methods of treatment have been discussed with the patient and family. PATIENT HAS EXHAUSTED NON-OPERATIVE MODALITIES     After consideration of risks, benefits and other options for treatment, the patient has consented to surgical intervention.     SEE OFFICE NOTE    Deonna Angel MD

## 2020-01-05 NOTE — BRIEF OP NOTE
BRIEF OPERATIVE NOTE    Date of Procedure: 1/10/2020     Preoperative Diagnosis:  ROTATOR CUFF TEAR RIGHT SHOULDER      SLAP TEAR RIGHT SHOULDER      BICIPITAL SPRAIN RIGHT SHOULDER      GLENOHUMERAL OSTEOARTHRITIS RIGHT SHOULDER    Postoperative Diagnosis:  SAME    Procedure(s): REVERSE RIGHT TOTAL SHOULDER ARTHROPLASTY WITH DELTA EXTEND PROSTHESIS, BICEPS TENODESIS    Surgeon(s) and Role:     * Amberly Forbes MD - Primary         Assistant Staff:  Mariann De Luna NP      Surgical Staff:  Circ-1: (Unknown)  Scrub Tech-1: (Unknown)  Scrub Tech-2: (Unknown)  Scrub Tech-3: (Unknown)  No case tracking events are documented in the log. Anesthesia:  GENERAL WITH INTERSCALENE BLOCK    Estimated Blood Loss: 150 cc. Complications: NONE    Implants:   Implant Name Type Inv.  Item Serial No.  Lot No. LRB No. Used Action   COMP METAGLENE LNG PEG +10MM -- DELTA XTEND - [id number]  COMP METAGLENE LNG PEG +10MM -- DELTA XTEND [id number] [facility] [employer] [id number] Right 1 Implanted   COMPNT GLENOSPHRE ECC D42MM +4 -- DELTA XTEND - [id number]  COMPNT GLENOSPHRE ECC D42MM +4 -- DELTA XTEND [id number] [facility] [employer]THOP[employer] [id number] Right 1 Implanted   SCR BNE CRTX ST 4.5X60MM SS --  - [id number]  SCR BNE CRTX ST 4.5X60MM SS --  [id number] [facility] [place] [id number] Right 1 Implanted   SCR BNE CRTX ST 4.5X40MM SS --  - [id number]  SCR BNE CRTX ST 4.5X40MM SS --  [id number] [employer] [place] [id number] Right 1 Implanted   SCR BNE CRTX ST 4.5X18MM SS --  - [id number]  SCR BNE CRTX ST 4.5X18MM SS --  [id number] [employer] [place] [id number] Right 2 Implanted   STEM HUM MOD CEMTLS HA 12MM -- DELTA XTEND - [id number]  STEM HUM MOD CEMTLS HA 12MM -- DELTA XTEND [id number] [facility] [employer]THOPED[employer] [id number] Right 1 Implanted   [facility] [facility] P[facility]   [id number] [employer] [id number] Right 1 Implanted   CUP HUM STD DELT PE 42+9MM -- DELTA XTEND - [id number]  CUP HUM STD DELT PE 42+9MM -- Eric Earthly [id number] [facility] [id number] Right 1 Implanted   RSTRCTR KALPANA BNE BIOABSRB 12MM -- Faizan Sulma - [id number]  RSTRCTR KALPANA BNE BIOABSRB 12MM -- Faizan Sulma [id number] [facility] ORTHOPEDICS [id number] Right 1 Implanted   CEMENT BONE ORTHO 40GM - [id number]  CEMENT BONE ORTHO 40GM [id number] [employer] HOW [id number] Right 1 Implanted   [address] [place] [id number] Right 1 Implanted       Yaz Guardado MD

## 2020-01-06 NOTE — PERIOP NOTES
ekgs dated 10/10/19 and 8/8/18 received from PCP - Stacey Mark MD - are within [facility] protocols.

## 2020-01-06 NOTE — ADVANCED PRACTICE NURSE
Total Joint Surgery Preoperative Chart Review      Patient ID:  Sandra Sage  [id number]  80 y.o.  1960  Surgeon: Dr. Brandon Carlin  Date of Surgery: 1/10/2020  Procedure: Total Right Shoulder Arthroplasty  Primary Care Physician: Ramesh Mayorga -[phone]  Specialty Physician(s):      Subjective: Sandra Sage is a 64 y.o. BLACK OR  male who presents for preoperative evaluation for Total Right Shoulder arthroplasty. This is a preoperative chart review note based on data collected by the nurse at the surgical Pre-Assessment visit. Past Medical History:   Diagnosis Date    Drug addiction [facility])     history of; drug free for 13 years.  Eczema     Hiatal hernia     Kidney stones     Osteoarthritis of right glenohumeral joint 1/5/2020    Rotator cuff tear     left      Past Surgical History:   Procedure Laterality Date    HX KNEE ARTHROSCOPY Right     HX ORTHOPAEDIC Left 1990's    tendon release - due to injury    UPPER ARM/ELBOW SURGERY UNLISTED Left     fx repair     History reviewed. No pertinent family history. Social History     Tobacco Use    Smoking status: Former Smoker     Last attempt to quit: 3/1/2009     Years since quitting: 10.8    Smokeless tobacco: Never Used   Substance Use Topics    Alcohol use: No       Prior to Admission medications    Medication Sig Start Date End Date Taking? Authorizing Provider   ibuprofen (MOTRIN) 800 mg tablet Take 800 mg by mouth every eight (8) hours as needed for Pain. Yes Provider, Historical   acetaminophen (TYLENOL PO) Take  by mouth as needed. Yes Provider, Historical     No Known Allergies       Objective:     Physical Exam:   No data found. ECG:    EKG Results     None          Data Review:   Labs:     Results for Nehemias Ramos (MRN [id number]) as of 1/6/2020 13:35   Ref.  Range 1/3/2020 12:37   Sodium Latest Ref Range: 136 - 145 mmol/L 137   Potassium Latest Ref Range: 3.5 - 5.1 mmol/L 4.0 Chloride Latest Ref Range: 98 - 107 mmol/L 107   CO2 Latest Ref Range: 21 - 32 mmol/L 27   Anion gap Latest Ref Range: 7 - 16 mmol/L 3 (L)   Glucose Latest Ref Range: 65 - 100 mg/dL 93   BUN Latest Ref Range: 6 - 23 MG/DL 13   Creatinine Latest Ref Range: 0.8 - 1.5 MG/DL 1.01   Calcium Latest Ref Range: 8.3 - 10.4 MG/DL 9.0   Magnesium Latest Ref Range: 1.8 - 2.4 mg/dL 2.0   GFR est non-AA Latest Ref Range: >60 ml/min/1.73m2 >60   GFR est AA Latest Ref Range: >60 ml/min/1.73m2 >60   Bilirubin, total Latest Ref Range: 0.2 - 1.1 MG/DL 0.6   Protein, total Latest Ref Range: 6.3 - 8.2 g/dL 7.7   Albumin Latest Ref Range: 3.5 - 5.0 g/dL 3.7   Globulin Latest Ref Range: 2.3 - 3.5 g/dL 4.0 (H)   A-G Ratio Latest Ref Range: 1.2 - 3.5   0.9 (L)   ALT (SGPT) Latest Ref Range: 12 - 65 U/L 20   AST Latest Ref Range: 15 - 37 U/L 19   Alk. phosphatase Latest Ref Range: 50 - 136 U/L 92       Problem List:  )  Patient Active Problem List   Diagnosis Code    Traumatic tear of right rotator cuff S46.011A    Strain of muscle, fascia and tendon of long head of biceps, right arm, initial encounter S46.111A    Superior glenoid labrum lesion of right shoulder S43.431A    Osteoarthritis of right glenohumeral joint M19.011       Total Joint Surgery Pre-Assessment Recommendations:           Recommend continuous saturation monitoring during hospitalization. PEP therapy BID.         Signed By: Mily Simon, NP-C    January 6, 2020

## 2020-01-09 ENCOUNTER — HOSPITAL ENCOUNTER (OUTPATIENT)
Dept: LAB | Age: 60
Discharge: HOME | End: 2020-01-09
Payer: SELF-PAY

## 2020-01-09 PROCEDURE — 86923 COMPATIBILITY TEST ELECTRIC: CPT

## 2020-01-09 PROCEDURE — 36415 COLL VENOUS BLD VENIPUNCTURE: CPT

## 2020-01-09 PROCEDURE — 86900 BLOOD TYPING SEROLOGIC ABO: CPT

## 2020-01-10 ENCOUNTER — ANESTHESIA (OUTPATIENT)
Dept: SURGERY | Age: 60
DRG: 483 | End: 2020-01-10
Payer: OTHER MISCELLANEOUS

## 2020-01-10 ENCOUNTER — HOSPITAL ENCOUNTER (INPATIENT)
Age: 60
LOS: 2 days | Discharge: HOME HEALTH | DRG: 483 | End: 2020-01-12
Attending: ORTHOPAEDIC SURGERY | Admitting: ORTHOPAEDIC SURGERY
Payer: OTHER MISCELLANEOUS

## 2020-01-10 ENCOUNTER — APPOINTMENT (OUTPATIENT)
Dept: GENERAL RADIOLOGY | Age: 60
DRG: 483 | End: 2020-01-10
Attending: ORTHOPAEDIC SURGERY
Payer: OTHER MISCELLANEOUS

## 2020-01-10 ENCOUNTER — ANESTHESIA EVENT (OUTPATIENT)
Dept: SURGERY | Age: 60
DRG: 483 | End: 2020-01-10
Payer: OTHER MISCELLANEOUS

## 2020-01-10 PROBLEM — M19.011 ARTHRITIS OF RIGHT SHOULDER REGION: Status: ACTIVE | Noted: 2020-01-10

## 2020-01-10 PROBLEM — S46.111A RUPTURE LONG HEAD BICEPS TENDON, RIGHT, INITIAL ENCOUNTER: Status: ACTIVE | Noted: 2020-01-10

## 2020-01-10 LAB
EST. AVERAGE GLUCOSE BLD GHB EST-MCNC: 120 MG/DL
GLUCOSE BLD STRIP.AUTO-MCNC: 84 MG/DL (ref 65–100)
HBA1C MFR BLD: 5.8 %

## 2020-01-10 PROCEDURE — 76010010054 HC POST OP PAIN BLOCK: Performed by: ORTHOPAEDIC SURGERY

## 2020-01-10 PROCEDURE — C1713 ANCHOR/SCREW BN/BN,TIS/BN: HCPCS | Performed by: ORTHOPAEDIC SURGERY

## 2020-01-10 PROCEDURE — 74011250637 HC RX REV CODE- 250/637: Performed by: NURSE PRACTITIONER

## 2020-01-10 PROCEDURE — 77030018547 HC SUT ETHBND1 J&J -B: Performed by: ORTHOPAEDIC SURGERY

## 2020-01-10 PROCEDURE — 77030020163 HC SEAL HEMSTAT HALY -B: Performed by: ORTHOPAEDIC SURGERY

## 2020-01-10 PROCEDURE — C1776 JOINT DEVICE (IMPLANTABLE): HCPCS | Performed by: ORTHOPAEDIC SURGERY

## 2020-01-10 PROCEDURE — 74011250636 HC RX REV CODE- 250/636: Performed by: NURSE PRACTITIONER

## 2020-01-10 PROCEDURE — 74011000250 HC RX REV CODE- 250: Performed by: NURSE ANESTHETIST, CERTIFIED REGISTERED

## 2020-01-10 PROCEDURE — 74011250636 HC RX REV CODE- 250/636: Performed by: ANESTHESIOLOGY

## 2020-01-10 PROCEDURE — 77030031139 HC SUT VCRL2 J&J -A: Performed by: ORTHOPAEDIC SURGERY

## 2020-01-10 PROCEDURE — 77030002913 HC SUT ETHBND J&J -B: Performed by: ORTHOPAEDIC SURGERY

## 2020-01-10 PROCEDURE — 94760 N-INVAS EAR/PLS OXIMETRY 1: CPT

## 2020-01-10 PROCEDURE — 77030003602 HC NDL NRV BLK BBMI -B: Performed by: ANESTHESIOLOGY

## 2020-01-10 PROCEDURE — 77030018836 HC SOL IRR NACL ICUM -A: Performed by: ORTHOPAEDIC SURGERY

## 2020-01-10 PROCEDURE — 77030020263 HC SOL INJ SOD CL0.9% LFCR 1000ML

## 2020-01-10 PROCEDURE — 74011000302 HC RX REV CODE- 302: Performed by: NURSE PRACTITIONER

## 2020-01-10 PROCEDURE — 77030004434 HC BUR RND STRY -B: Performed by: ORTHOPAEDIC SURGERY

## 2020-01-10 PROCEDURE — 73030 X-RAY EXAM OF SHOULDER: CPT

## 2020-01-10 PROCEDURE — 77030037088 HC TUBE ENDOTRACH ORAL NSL COVD-A: Performed by: ANESTHESIOLOGY

## 2020-01-10 PROCEDURE — 77030012935 HC DRSG AQUACEL BMS -B: Performed by: ORTHOPAEDIC SURGERY

## 2020-01-10 PROCEDURE — 65270000029 HC RM PRIVATE

## 2020-01-10 PROCEDURE — 77030020782 HC GWN BAIR PAWS FLX 3M -B: Performed by: ANESTHESIOLOGY

## 2020-01-10 PROCEDURE — 77030040361 HC SLV COMPR DVT MDII -B: Performed by: ORTHOPAEDIC SURGERY

## 2020-01-10 PROCEDURE — 86580 TB INTRADERMAL TEST: CPT | Performed by: NURSE PRACTITIONER

## 2020-01-10 PROCEDURE — 76010000171 HC OR TIME 2 TO 2.5 HR INTENSV-TIER 1: Performed by: ORTHOPAEDIC SURGERY

## 2020-01-10 PROCEDURE — 77030040361 HC SLV COMPR DVT MDII -B

## 2020-01-10 PROCEDURE — 0RRJ00Z REPLACEMENT OF RIGHT SHOULDER JOINT WITH REVERSE BALL AND SOCKET SYNTHETIC SUBSTITUTE, OPEN APPROACH: ICD-10-PCS | Performed by: ORTHOPAEDIC SURGERY

## 2020-01-10 PROCEDURE — 76060000035 HC ANESTHESIA 2 TO 2.5 HR: Performed by: ORTHOPAEDIC SURGERY

## 2020-01-10 PROCEDURE — 76942 ECHO GUIDE FOR BIOPSY: CPT | Performed by: ORTHOPAEDIC SURGERY

## 2020-01-10 PROCEDURE — 74011250637 HC RX REV CODE- 250/637: Performed by: ANESTHESIOLOGY

## 2020-01-10 PROCEDURE — 77030035643 HC BLD SAW OSC PRECIS STRY -C: Performed by: ORTHOPAEDIC SURGERY

## 2020-01-10 PROCEDURE — 77030002986 HC SUT PROL J&J -A: Performed by: ORTHOPAEDIC SURGERY

## 2020-01-10 PROCEDURE — 82962 GLUCOSE BLOOD TEST: CPT

## 2020-01-10 PROCEDURE — 77030039425 HC BLD LARYNG TRULITE DISP TELE -A: Performed by: ANESTHESIOLOGY

## 2020-01-10 PROCEDURE — 76210000006 HC OR PH I REC 0.5 TO 1 HR: Performed by: ORTHOPAEDIC SURGERY

## 2020-01-10 PROCEDURE — 94762 N-INVAS EAR/PLS OXIMTRY CONT: CPT

## 2020-01-10 PROCEDURE — 74011000250 HC RX REV CODE- 250: Performed by: ANESTHESIOLOGY

## 2020-01-10 PROCEDURE — 74011000272 HC RX REV CODE- 272: Performed by: ORTHOPAEDIC SURGERY

## 2020-01-10 PROCEDURE — 74011250636 HC RX REV CODE- 250/636: Performed by: NURSE ANESTHETIST, CERTIFIED REGISTERED

## 2020-01-10 PROCEDURE — 83036 HEMOGLOBIN GLYCOSYLATED A1C: CPT

## 2020-01-10 PROCEDURE — 77030027138 HC INCENT SPIROMETER -A

## 2020-01-10 PROCEDURE — 77030013708 HC HNDPC SUC IRR PULS STRY –B: Performed by: ORTHOPAEDIC SURGERY

## 2020-01-10 PROCEDURE — 77030012547: Performed by: ORTHOPAEDIC SURGERY

## 2020-01-10 DEVICE — COMPONENT GLEN FIX DIA10MM SHLDR METAGLENE LNG PEG GLOB: Type: IMPLANTABLE DEVICE | Site: SHOULDER | Status: FUNCTIONAL

## 2020-01-10 DEVICE — SCREW BNE L60MM DIA4.5MM PROX CORT TIB S STL ST LOK FULL: Type: IMPLANTABLE DEVICE | Site: SHOULDER | Status: FUNCTIONAL

## 2020-01-10 DEVICE — RESTRICTOR CEM DIA12MM UNIV FEM CNL UHMWPE BIOSTP G: Type: IMPLANTABLE DEVICE | Site: SHOULDER | Status: FUNCTIONAL

## 2020-01-10 DEVICE — SCREW BNE L40MM DIA4.5MM PROX CORT TIB S STL ST LOK FULL: Type: IMPLANTABLE DEVICE | Site: SHOULDER | Status: FUNCTIONAL

## 2020-01-10 DEVICE — STEM HUM SZ 0 L144MM DIA12MM STD SHLDR CO CHROM HA: Type: IMPLANTABLE DEVICE | Site: SHOULDER | Status: FUNCTIONAL

## 2020-01-10 DEVICE — COMPONENT GLENOSPHERE ECCENTRIC XTEND 42MM PLUS 4MM: Type: IMPLANTABLE DEVICE | Site: SHOULDER | Status: FUNCTIONAL

## 2020-01-10 DEVICE — CEMENT BNE FL 20ML MONMR 40GM -- SIMPLEX P: Type: IMPLANTABLE DEVICE | Site: SHOULDER | Status: FUNCTIONAL

## 2020-01-10 DEVICE — CUP HUM DIA42MM +9MM OFFSET STD SHLDR POLYETH DELT XTEND: Type: IMPLANTABLE DEVICE | Site: SHOULDER | Status: FUNCTIONAL

## 2020-01-10 DEVICE — SCREW BNE L18MM DIA4.5MM PROX CORT TIB S STL ST LOK FULL: Type: IMPLANTABLE DEVICE | Site: SHOULDER | Status: FUNCTIONAL

## 2020-01-10 RX ORDER — HYDROMORPHONE HYDROCHLORIDE 2 MG/ML
0.5 INJECTION, SOLUTION INTRAMUSCULAR; INTRAVENOUS; SUBCUTANEOUS
Status: DISCONTINUED | OUTPATIENT
Start: 2020-01-10 | End: 2020-01-10 | Stop reason: HOSPADM

## 2020-01-10 RX ORDER — PROPOFOL 10 MG/ML
INJECTION, EMULSION INTRAVENOUS AS NEEDED
Status: DISCONTINUED | OUTPATIENT
Start: 2020-01-10 | End: 2020-01-10 | Stop reason: HOSPADM

## 2020-01-10 RX ORDER — CEFAZOLIN SODIUM/WATER 2 G/20 ML
2 SYRINGE (ML) INTRAVENOUS EVERY 8 HOURS
Status: COMPLETED | OUTPATIENT
Start: 2020-01-10 | End: 2020-01-11

## 2020-01-10 RX ORDER — SODIUM CHLORIDE 0.9 % (FLUSH) 0.9 %
5-40 SYRINGE (ML) INJECTION EVERY 8 HOURS
Status: DISCONTINUED | OUTPATIENT
Start: 2020-01-10 | End: 2020-01-10 | Stop reason: HOSPADM

## 2020-01-10 RX ORDER — FACIAL-BODY WIPES
10 EACH TOPICAL DAILY PRN
Status: DISCONTINUED | OUTPATIENT
Start: 2020-01-10 | End: 2020-01-12 | Stop reason: HOSPADM

## 2020-01-10 RX ORDER — SODIUM CHLORIDE 0.9 % (FLUSH) 0.9 %
5-40 SYRINGE (ML) INJECTION AS NEEDED
Status: DISCONTINUED | OUTPATIENT
Start: 2020-01-10 | End: 2020-01-12 | Stop reason: HOSPADM

## 2020-01-10 RX ORDER — IBUPROFEN 800 MG/1
800 TABLET ORAL
Status: DISCONTINUED | OUTPATIENT
Start: 2020-01-10 | End: 2020-01-12 | Stop reason: HOSPADM

## 2020-01-10 RX ORDER — ONDANSETRON 2 MG/ML
INJECTION INTRAMUSCULAR; INTRAVENOUS AS NEEDED
Status: DISCONTINUED | OUTPATIENT
Start: 2020-01-10 | End: 2020-01-10 | Stop reason: HOSPADM

## 2020-01-10 RX ORDER — HYDROMORPHONE HYDROCHLORIDE 1 MG/ML
1 INJECTION, SOLUTION INTRAMUSCULAR; INTRAVENOUS; SUBCUTANEOUS
Status: DISCONTINUED | OUTPATIENT
Start: 2020-01-10 | End: 2020-01-12 | Stop reason: HOSPADM

## 2020-01-10 RX ORDER — SODIUM CHLORIDE, SODIUM LACTATE, POTASSIUM CHLORIDE, CALCIUM CHLORIDE 600; 310; 30; 20 MG/100ML; MG/100ML; MG/100ML; MG/100ML
75 INJECTION, SOLUTION INTRAVENOUS CONTINUOUS
Status: DISCONTINUED | OUTPATIENT
Start: 2020-01-10 | End: 2020-01-10 | Stop reason: HOSPADM

## 2020-01-10 RX ORDER — HYDROGEN PEROXIDE 3 %
SOLUTION, NON-ORAL MISCELLANEOUS AS NEEDED
Status: DISCONTINUED | OUTPATIENT
Start: 2020-01-10 | End: 2020-01-10 | Stop reason: HOSPADM

## 2020-01-10 RX ORDER — ACETAMINOPHEN 10 MG/ML
1000 INJECTION, SOLUTION INTRAVENOUS ONCE
Status: COMPLETED | OUTPATIENT
Start: 2020-01-10 | End: 2020-01-10

## 2020-01-10 RX ORDER — ROCURONIUM BROMIDE 10 MG/ML
INJECTION, SOLUTION INTRAVENOUS AS NEEDED
Status: DISCONTINUED | OUTPATIENT
Start: 2020-01-10 | End: 2020-01-10 | Stop reason: HOSPADM

## 2020-01-10 RX ORDER — SODIUM CHLORIDE 0.9 % (FLUSH) 0.9 %
5-40 SYRINGE (ML) INJECTION EVERY 8 HOURS
Status: DISCONTINUED | OUTPATIENT
Start: 2020-01-10 | End: 2020-01-12 | Stop reason: HOSPADM

## 2020-01-10 RX ORDER — FAMOTIDINE 20 MG/1
20 TABLET, FILM COATED ORAL ONCE
Status: COMPLETED | OUTPATIENT
Start: 2020-01-10 | End: 2020-01-10

## 2020-01-10 RX ORDER — ACETAMINOPHEN 325 MG/1
650 TABLET ORAL
Status: DISCONTINUED | OUTPATIENT
Start: 2020-01-10 | End: 2020-01-12 | Stop reason: HOSPADM

## 2020-01-10 RX ORDER — MAGNESIUM CITRATE
296 SOLUTION, ORAL ORAL
Status: DISCONTINUED | OUTPATIENT
Start: 2020-01-10 | End: 2020-01-12 | Stop reason: HOSPADM

## 2020-01-10 RX ORDER — DOCUSATE SODIUM 100 MG/1
100 CAPSULE, LIQUID FILLED ORAL DAILY
Status: DISCONTINUED | OUTPATIENT
Start: 2020-01-11 | End: 2020-01-12 | Stop reason: HOSPADM

## 2020-01-10 RX ORDER — MIDAZOLAM HYDROCHLORIDE 1 MG/ML
2 INJECTION, SOLUTION INTRAMUSCULAR; INTRAVENOUS ONCE
Status: DISCONTINUED | OUTPATIENT
Start: 2020-01-10 | End: 2020-01-10 | Stop reason: HOSPADM

## 2020-01-10 RX ORDER — FENTANYL CITRATE 50 UG/ML
100 INJECTION, SOLUTION INTRAMUSCULAR; INTRAVENOUS ONCE
Status: COMPLETED | OUTPATIENT
Start: 2020-01-10 | End: 2020-01-10

## 2020-01-10 RX ORDER — SODIUM CHLORIDE 0.9 % (FLUSH) 0.9 %
5-40 SYRINGE (ML) INJECTION AS NEEDED
Status: DISCONTINUED | OUTPATIENT
Start: 2020-01-10 | End: 2020-01-10 | Stop reason: HOSPADM

## 2020-01-10 RX ORDER — LIDOCAINE HYDROCHLORIDE 10 MG/ML
0.1 INJECTION INFILTRATION; PERINEURAL AS NEEDED
Status: DISCONTINUED | OUTPATIENT
Start: 2020-01-10 | End: 2020-01-10 | Stop reason: HOSPADM

## 2020-01-10 RX ORDER — HYDROMORPHONE HYDROCHLORIDE 2 MG/1
2 TABLET ORAL
Status: DISCONTINUED | OUTPATIENT
Start: 2020-01-10 | End: 2020-01-11

## 2020-01-10 RX ORDER — LANOLIN ALCOHOL/MO/W.PET/CERES
1 CREAM (GRAM) TOPICAL
Status: DISCONTINUED | OUTPATIENT
Start: 2020-01-11 | End: 2020-01-12 | Stop reason: HOSPADM

## 2020-01-10 RX ORDER — LIDOCAINE HYDROCHLORIDE 20 MG/ML
INJECTION, SOLUTION EPIDURAL; INFILTRATION; INTRACAUDAL; PERINEURAL AS NEEDED
Status: DISCONTINUED | OUTPATIENT
Start: 2020-01-10 | End: 2020-01-10 | Stop reason: HOSPADM

## 2020-01-10 RX ORDER — TEMAZEPAM 15 MG/1
15 CAPSULE ORAL
Status: DISCONTINUED | OUTPATIENT
Start: 2020-01-10 | End: 2020-01-12 | Stop reason: HOSPADM

## 2020-01-10 RX ORDER — GLYCOPYRROLATE 0.2 MG/ML
INJECTION INTRAMUSCULAR; INTRAVENOUS AS NEEDED
Status: DISCONTINUED | OUTPATIENT
Start: 2020-01-10 | End: 2020-01-10 | Stop reason: HOSPADM

## 2020-01-10 RX ORDER — SODIUM CHLORIDE 9 MG/ML
75 INJECTION, SOLUTION INTRAVENOUS CONTINUOUS
Status: DISPENSED | OUTPATIENT
Start: 2020-01-10 | End: 2020-01-11

## 2020-01-10 RX ORDER — MIDAZOLAM HYDROCHLORIDE 1 MG/ML
2 INJECTION, SOLUTION INTRAMUSCULAR; INTRAVENOUS ONCE
Status: COMPLETED | OUTPATIENT
Start: 2020-01-10 | End: 2020-01-10

## 2020-01-10 RX ORDER — ONDANSETRON 2 MG/ML
4 INJECTION INTRAMUSCULAR; INTRAVENOUS
Status: DISCONTINUED | OUTPATIENT
Start: 2020-01-10 | End: 2020-01-12

## 2020-01-10 RX ORDER — CEFAZOLIN SODIUM/WATER 2 G/20 ML
2 SYRINGE (ML) INTRAVENOUS ONCE
Status: COMPLETED | OUTPATIENT
Start: 2020-01-10 | End: 2020-01-10

## 2020-01-10 RX ORDER — NEOSTIGMINE METHYLSULFATE 1 MG/ML
INJECTION, SOLUTION INTRAVENOUS AS NEEDED
Status: DISCONTINUED | OUTPATIENT
Start: 2020-01-10 | End: 2020-01-10 | Stop reason: HOSPADM

## 2020-01-10 RX ORDER — DEXAMETHASONE SODIUM PHOSPHATE 4 MG/ML
INJECTION, SOLUTION INTRA-ARTICULAR; INTRALESIONAL; INTRAMUSCULAR; INTRAVENOUS; SOFT TISSUE AS NEEDED
Status: DISCONTINUED | OUTPATIENT
Start: 2020-01-10 | End: 2020-01-10 | Stop reason: HOSPADM

## 2020-01-10 RX ORDER — OXYCODONE HYDROCHLORIDE 5 MG/1
5 TABLET ORAL
Status: DISCONTINUED | OUTPATIENT
Start: 2020-01-10 | End: 2020-01-10 | Stop reason: HOSPADM

## 2020-01-10 RX ORDER — BUPIVACAINE HYDROCHLORIDE AND EPINEPHRINE 5; 5 MG/ML; UG/ML
INJECTION, SOLUTION EPIDURAL; INTRACAUDAL; PERINEURAL
Status: COMPLETED | OUTPATIENT
Start: 2020-01-10 | End: 2020-01-10

## 2020-01-10 RX ORDER — OXYCODONE HYDROCHLORIDE 5 MG/1
10 TABLET ORAL
Status: COMPLETED | OUTPATIENT
Start: 2020-01-10 | End: 2020-01-10

## 2020-01-10 RX ADMIN — OXYCODONE 10 MG: 5 TABLET ORAL at 15:30

## 2020-01-10 RX ADMIN — HYDROMORPHONE HYDROCHLORIDE 2 MG: 2 TABLET ORAL at 23:44

## 2020-01-10 RX ADMIN — HYDROMORPHONE HYDROCHLORIDE 1 MG: 1 INJECTION, SOLUTION INTRAMUSCULAR; INTRAVENOUS; SUBCUTANEOUS at 22:35

## 2020-01-10 RX ADMIN — ONDANSETRON 4 MG: 2 INJECTION INTRAMUSCULAR; INTRAVENOUS at 12:59

## 2020-01-10 RX ADMIN — SODIUM CHLORIDE, SODIUM LACTATE, POTASSIUM CHLORIDE, AND CALCIUM CHLORIDE 75 ML/HR: 600; 310; 30; 20 INJECTION, SOLUTION INTRAVENOUS at 10:46

## 2020-01-10 RX ADMIN — HYDROMORPHONE HYDROCHLORIDE 1 MG: 1 INJECTION, SOLUTION INTRAMUSCULAR; INTRAVENOUS; SUBCUTANEOUS at 17:48

## 2020-01-10 RX ADMIN — Medication 2 G: at 12:44

## 2020-01-10 RX ADMIN — PROPOFOL 180 MG: 10 INJECTION, EMULSION INTRAVENOUS at 12:46

## 2020-01-10 RX ADMIN — ACETAMINOPHEN 1000 MG: 10 INJECTION, SOLUTION INTRAVENOUS at 15:39

## 2020-01-10 RX ADMIN — TUBERCULIN PURIFIED PROTEIN DERIVATIVE 5 UNITS: 5 INJECTION INTRADERMAL at 16:00

## 2020-01-10 RX ADMIN — Medication 2 G: at 20:59

## 2020-01-10 RX ADMIN — Medication 10 ML: at 23:44

## 2020-01-10 RX ADMIN — BUPIVACAINE HYDROCHLORIDE AND EPINEPHRINE BITARTRATE 30 ML: 5; .005 INJECTION, SOLUTION EPIDURAL; INTRACAUDAL; PERINEURAL at 12:08

## 2020-01-10 RX ADMIN — FAMOTIDINE 20 MG: 20 TABLET, FILM COATED ORAL at 10:45

## 2020-01-10 RX ADMIN — MIDAZOLAM 2 MG: 1 INJECTION INTRAMUSCULAR; INTRAVENOUS at 12:01

## 2020-01-10 RX ADMIN — Medication 3 MG: at 14:37

## 2020-01-10 RX ADMIN — DEXAMETHASONE SODIUM PHOSPHATE 10 MG: 4 INJECTION, SOLUTION INTRAMUSCULAR; INTRAVENOUS at 12:59

## 2020-01-10 RX ADMIN — GLYCOPYRROLATE 0.4 MG: 0.2 INJECTION, SOLUTION INTRAMUSCULAR; INTRAVENOUS at 14:37

## 2020-01-10 RX ADMIN — HYDROMORPHONE HYDROCHLORIDE 1 MG: 1 INJECTION, SOLUTION INTRAMUSCULAR; INTRAVENOUS; SUBCUTANEOUS at 16:26

## 2020-01-10 RX ADMIN — FENTANYL CITRATE 100 MCG: 50 INJECTION, SOLUTION INTRAMUSCULAR; INTRAVENOUS at 12:01

## 2020-01-10 RX ADMIN — ROCURONIUM BROMIDE 50 MG: 10 INJECTION, SOLUTION INTRAVENOUS at 12:46

## 2020-01-10 RX ADMIN — HYDROMORPHONE HYDROCHLORIDE 0.5 MG: 2 INJECTION INTRAMUSCULAR; INTRAVENOUS; SUBCUTANEOUS at 15:03

## 2020-01-10 RX ADMIN — LIDOCAINE HYDROCHLORIDE 100 MG: 20 INJECTION, SOLUTION EPIDURAL; INFILTRATION; INTRACAUDAL; PERINEURAL at 12:46

## 2020-01-10 NOTE — ANESTHESIA PROCEDURE NOTES
Peripheral Block    Start time: 1/10/2020 12:01 PM  End time: 1/10/2020 12:08 PM  Performed by: Parth Smith MD  Authorized by: Parth Smith MD       Pre-procedure: Indications: at surgeon's request, post-op pain management and procedure for pain    Preanesthetic Checklist: patient identified, risks and benefits discussed, site marked, timeout performed, anesthesia consent given and patient being monitored    Timeout Time: 12:01          Block Type:   Block Type:   Interscalene  Laterality:  Right  Monitoring:  Standard ASA monitoring, continuous pulse ox, frequent vital sign checks, heart rate, oxygen and responsive to questions  Injection Technique:  Single shot  Procedures: ultrasound guided and nerve stimulator    Patient Position: supine  Prep: chlorhexidine    Location:  Interscalene  Needle Type:  Stimuplex  Needle Gauge:  21 G  Needle Localization:  Ultrasound guidance and anatomical landmarks  Motor Response: minimal motor response >0.4 mA      Assessment:  Number of attempts:  1  Injection Assessment:  Incremental injection every 5 mL, local visualized surrounding nerve on ultrasound, negative aspiration for blood, negative aspiration for CSF, no paresthesia, no intravascular symptoms and ultrasound image on chart  Patient tolerance:  Patient tolerated the procedure well with no immediate complications

## 2020-01-10 NOTE — DISCHARGE SUMMARY
[facility] [facility] Discharge Summary      Patient ID:  Giovanni Forrester  [id number]  64 y.o.  1960    Allergies: Patient has no known allergies. Admit date: 1/10/2020    Discharge date and time: 1/12/2019     Admitting Physician: Randal Aguilar MD     Discharge Physician: Randal Aguilar MD      * Admission Diagnoses: Rotator cuff arthropathy, right [M12.811]  Rotator cuff arthropathy, right [M12.811]    * Discharge Diagnoses:   Hospital Problems as of 1/10/2020 Never Reviewed          Codes Class Noted - Resolved POA    Traumatic tear of right rotator cuff ICD-10-CM: S46.011A  ICD-9-CM: 840.4  1/5/2020 - Present Yes        Strain of muscle, fascia and tendon of long head of biceps, right arm, initial encounter ICD-10-CM: S46.111A  ICD-9-CM: 840.8  1/5/2020 - Present Yes        Superior glenoid labrum lesion of right shoulder ICD-10-CM: S43.431A  ICD-9-CM: 840.7  1/5/2020 - Present Yes        * (Principal) Osteoarthritis of right glenohumeral joint ICD-10-CM: M19.011  ICD-9-CM: 715.91  1/5/2020 - Present Yes              Surgeon: Randal Aguilar MD          * Procedure: Procedure(s):  SHOULDER ARTHROPLASTY TOTAL REVERSE WITH BICEPS TENODESIS/ RIGHT/ INTERSCLANE/ REBLOCK 01/11 DELTA XTEND           Perioperative Antibiotics: Ancef  _x__                                                Vancomycin  ___          Post Op complications: none        * Discharge Condition: good  Wound appears to be healing without any evidence of infection.        * Discharged to: Home    * Follow-up Care/Discharge instructions:  - Resume pre hospital diet            - Resume home medications per medical continuation form     CONTINUE PHYSICAL THERAPY  Sling right shoulder  - Follow up in office as scheduled       Signed:  Randal Aguilar MD  1/10/2020  12:12 PM

## 2020-01-10 NOTE — PROGRESS NOTES
01/10/20 1717   Oxygen Therapy   O2 Sat (%) 96 %   Pulse via Oximetry 74 beats per minute   O2 Device Room air   O2 Flow Rate (L/min) 0 l/min   Incentive Spirometry Treatment   Actual Volume (ml)   (apt eating right now)    Patient encouraged to doIS  every hour while awake-patient agreed. No shortness of breath or distress noted. BS are clear b/l.    [facility] notes reviewed- continuous sat # 04 ordered HS

## 2020-01-10 NOTE — ANESTHESIA POSTPROCEDURE EVALUATION
Procedure(s):  REVERSE RIGHT TOTAL SHOULDER ARTHROPLASTY WITH DELTA EXTEND PROSTHESIS, BICEPS TENODESIS.     general    Anesthesia Post Evaluation      Multimodal analgesia: multimodal analgesia used between 6 hours prior to anesthesia start to PACU discharge  Patient location during evaluation: bedside  Patient participation: complete - patient participated  Level of consciousness: responsive to verbal stimuli  Pain management: adequate  Airway patency: patent  Anesthetic complications: no  Cardiovascular status: hemodynamically stable  Respiratory status: spontaneous ventilation  Hydration status: stable        Vitals Value Taken Time   /98 1/10/2020  3:43 PM   Temp 36.9 °C (98.5 °F) 1/10/2020  3:18 PM   Pulse 64 1/10/2020  3:43 PM   Resp 18 1/10/2020  3:43 PM   SpO2 97 % 1/10/2020  3:43 PM

## 2020-01-10 NOTE — H&P
Date of Surgery Update: Ronny Webb was seen and examined. History and physical has been reviewed. The patient has been examined.  There have been no significant clinical changes since the completion of the originally dated History and Physical.    Signed By: Bill Iglesias MD     January 10, 2020 10:32 AM

## 2020-01-10 NOTE — PERIOP NOTES
TRANSFER - OUT REPORT:    Verbal report given to Carlyn Cardenas on Abbey Plummer  being transferred to [id number] for routine post - op       Report consisted of patients Situation, Background, Assessment and   Recommendations(SBAR). Information from the following report(s) SBAR, OR Summary, Procedure Summary and Intake/Output was reviewed with the receiving nurse. Lines:   Peripheral IV 01/10/20 Left Hand (Active)   Site Assessment Clean, dry, & intact 1/10/2020  3:18 PM   Phlebitis Assessment 0 1/10/2020  3:18 PM   Infiltration Assessment 0 1/10/2020  3:18 PM   Dressing Status Clean, dry, & intact 1/10/2020  3:18 PM   Dressing Type Tape;Transparent 1/10/2020  3:18 PM   Hub Color/Line Status Green; Infusing;Patent 1/10/2020  3:18 PM   Alcohol Cap Used No 1/10/2020 11:03 AM        Opportunity for questions and clarification was provided. Patient transported with:   O2 @ 2 liters    VTE prophylaxis orders have been written for Abbey Plummer. Patient and family given floor number and nurses name. Family updated re: pt status after security code verified.

## 2020-01-10 NOTE — ANESTHESIA PREPROCEDURE EVALUATION
Relevant Problems   No relevant active problems       Anesthetic History               Review of Systems / Medical History  Patient summary reviewed and pertinent labs reviewed    Pulmonary                   Neuro/Psych              Cardiovascular                  Exercise tolerance: >4 METS     GI/Hepatic/Renal         Renal disease: stones       Endo/Other        Arthritis     Other Findings              Physical Exam    Airway  Mallampati: II  TM Distance: > 6 cm  Neck ROM: normal range of motion   Mouth opening: Normal     Cardiovascular  Regular rate and rhythm,  S1 and S2 normal,  no murmur, click, rub, or gallop  Rhythm: regular  Rate: normal         Dental  No notable dental hx       Pulmonary  Breath sounds clear to auscultation               Abdominal         Other Findings            Anesthetic Plan    ASA: 2  Anesthesia type: general      Post-op pain plan if not by surgeon: peripheral nerve block single      Anesthetic plan and risks discussed with: Patient

## 2020-01-10 NOTE — PROGRESS NOTES
Pt resting in bed. Call light within reach. Bed low to ground. Side rails up x 3. Admission assessment complete. Radial pulses +2. Pt unable to move fingers at this time. Pain well controled at this time. No need voiced at this time.

## 2020-01-11 ENCOUNTER — ANESTHESIA EVENT (OUTPATIENT)
Dept: SURGERY | Age: 60
DRG: 483 | End: 2020-01-11
Payer: OTHER MISCELLANEOUS

## 2020-01-11 ENCOUNTER — ANESTHESIA (OUTPATIENT)
Dept: SURGERY | Age: 60
DRG: 483 | End: 2020-01-11
Payer: OTHER MISCELLANEOUS

## 2020-01-11 LAB
ANION GAP SERPL CALC-SCNC: 9 MMOL/L (ref 7–16)
BUN SERPL-MCNC: 12 MG/DL (ref 6–23)
CALCIUM SERPL-MCNC: 7.9 MG/DL (ref 8.3–10.4)
CHLORIDE SERPL-SCNC: 102 MMOL/L (ref 98–107)
CO2 SERPL-SCNC: 24 MMOL/L (ref 21–32)
CREAT SERPL-MCNC: 1.35 MG/DL (ref 0.8–1.5)
ERYTHROCYTE [DISTWIDTH] IN BLOOD BY AUTOMATED COUNT: 12.8 % (ref 11.9–14.6)
GLUCOSE SERPL-MCNC: 175 MG/DL (ref 65–100)
HCT VFR BLD AUTO: 34.3 % (ref 41.1–50.3)
HGB BLD-MCNC: 11.3 G/DL (ref 13.6–17.2)
MAGNESIUM SERPL-MCNC: 1.4 MG/DL (ref 1.8–2.4)
MCH RBC QN AUTO: 29.4 PG (ref 26.1–32.9)
MCHC RBC AUTO-ENTMCNC: 32.9 G/DL (ref 31.4–35)
MCV RBC AUTO: 89.3 FL (ref 79.6–97.8)
NRBC # BLD: 0 K/UL (ref 0–0.2)
PLATELET # BLD AUTO: 169 K/UL (ref 150–450)
PMV BLD AUTO: 11.7 FL (ref 9.4–12.3)
POTASSIUM SERPL-SCNC: 3.8 MMOL/L (ref 3.5–5.1)
RBC # BLD AUTO: 3.84 M/UL (ref 4.23–5.6)
SODIUM SERPL-SCNC: 135 MMOL/L (ref 136–145)
WBC # BLD AUTO: 11.9 K/UL (ref 4.3–11.1)

## 2020-01-11 PROCEDURE — 80048 BASIC METABOLIC PNL TOTAL CA: CPT

## 2020-01-11 PROCEDURE — 97161 PT EVAL LOW COMPLEX 20 MIN: CPT

## 2020-01-11 PROCEDURE — 74011250636 HC RX REV CODE- 250/636: Performed by: ORTHOPAEDIC SURGERY

## 2020-01-11 PROCEDURE — 76010010054 HC POST OP PAIN BLOCK

## 2020-01-11 PROCEDURE — 74011250636 HC RX REV CODE- 250/636

## 2020-01-11 PROCEDURE — 74011250637 HC RX REV CODE- 250/637: Performed by: NURSE PRACTITIONER

## 2020-01-11 PROCEDURE — 76942 ECHO GUIDE FOR BIOPSY: CPT

## 2020-01-11 PROCEDURE — 85027 COMPLETE CBC AUTOMATED: CPT

## 2020-01-11 PROCEDURE — 97116 GAIT TRAINING THERAPY: CPT

## 2020-01-11 PROCEDURE — 36415 COLL VENOUS BLD VENIPUNCTURE: CPT

## 2020-01-11 PROCEDURE — 83735 ASSAY OF MAGNESIUM: CPT

## 2020-01-11 PROCEDURE — 65270000029 HC RM PRIVATE

## 2020-01-11 PROCEDURE — 97110 THERAPEUTIC EXERCISES: CPT

## 2020-01-11 PROCEDURE — 74011250636 HC RX REV CODE- 250/636: Performed by: NURSE PRACTITIONER

## 2020-01-11 PROCEDURE — 74011250636 HC RX REV CODE- 250/636: Performed by: ANESTHESIOLOGY

## 2020-01-11 RX ORDER — FENTANYL CITRATE 50 UG/ML
100 INJECTION, SOLUTION INTRAMUSCULAR; INTRAVENOUS AS NEEDED
Status: CANCELLED | OUTPATIENT
Start: 2020-01-11

## 2020-01-11 RX ORDER — MAGNESIUM SULFATE HEPTAHYDRATE 40 MG/ML
INJECTION, SOLUTION INTRAVENOUS
Status: COMPLETED
Start: 2020-01-11 | End: 2020-01-11

## 2020-01-11 RX ORDER — NALOXONE HYDROCHLORIDE 0.4 MG/ML
0.1 INJECTION, SOLUTION INTRAMUSCULAR; INTRAVENOUS; SUBCUTANEOUS AS NEEDED
Status: CANCELLED | OUTPATIENT
Start: 2020-01-11 | End: 2020-01-11

## 2020-01-11 RX ORDER — FENTANYL CITRATE 50 UG/ML
50 INJECTION, SOLUTION INTRAMUSCULAR; INTRAVENOUS
Status: COMPLETED | OUTPATIENT
Start: 2020-01-11 | End: 2020-01-11

## 2020-01-11 RX ORDER — MAGNESIUM SULFATE 1 G/100ML
1 INJECTION INTRAVENOUS ONCE
Status: DISCONTINUED | OUTPATIENT
Start: 2020-01-11 | End: 2020-01-11

## 2020-01-11 RX ORDER — MAGNESIUM SULFATE HEPTAHYDRATE 40 MG/ML
2 INJECTION, SOLUTION INTRAVENOUS ONCE
Status: DISCONTINUED | OUTPATIENT
Start: 2020-01-11 | End: 2020-01-11 | Stop reason: SDUPTHER

## 2020-01-11 RX ORDER — FENTANYL CITRATE 50 UG/ML
INJECTION, SOLUTION INTRAMUSCULAR; INTRAVENOUS
Status: ACTIVE
Start: 2020-01-11 | End: 2020-01-11

## 2020-01-11 RX ORDER — ONDANSETRON 2 MG/ML
4 INJECTION INTRAMUSCULAR; INTRAVENOUS ONCE
Status: CANCELLED | OUTPATIENT
Start: 2020-01-11 | End: 2020-01-11

## 2020-01-11 RX ORDER — HYDROMORPHONE HYDROCHLORIDE 4 MG/1
4 TABLET ORAL
Status: DISCONTINUED | OUTPATIENT
Start: 2020-01-11 | End: 2020-01-12 | Stop reason: HOSPADM

## 2020-01-11 RX ORDER — OXYCODONE HYDROCHLORIDE 5 MG/1
5 TABLET ORAL
Status: CANCELLED | OUTPATIENT
Start: 2020-01-11 | End: 2020-01-12

## 2020-01-11 RX ORDER — DIPHENHYDRAMINE HYDROCHLORIDE 50 MG/ML
12.5 INJECTION, SOLUTION INTRAMUSCULAR; INTRAVENOUS ONCE
Status: CANCELLED | OUTPATIENT
Start: 2020-01-11 | End: 2020-01-11

## 2020-01-11 RX ORDER — HYDROMORPHONE HYDROCHLORIDE 2 MG/ML
0.5 INJECTION, SOLUTION INTRAMUSCULAR; INTRAVENOUS; SUBCUTANEOUS
Status: CANCELLED | OUTPATIENT
Start: 2020-01-11

## 2020-01-11 RX ORDER — HYDROMORPHONE HYDROCHLORIDE 4 MG/1
TABLET ORAL
Status: ACTIVE
Start: 2020-01-11 | End: 2020-01-12

## 2020-01-11 RX ORDER — MIDAZOLAM HYDROCHLORIDE 1 MG/ML
2 INJECTION, SOLUTION INTRAMUSCULAR; INTRAVENOUS
Status: CANCELLED | OUTPATIENT
Start: 2020-01-11 | End: 2020-01-12

## 2020-01-11 RX ORDER — METOCLOPRAMIDE HYDROCHLORIDE 5 MG/ML
5 INJECTION INTRAMUSCULAR; INTRAVENOUS EVERY 6 HOURS
Status: DISCONTINUED | OUTPATIENT
Start: 2020-01-11 | End: 2020-01-11

## 2020-01-11 RX ORDER — MIDAZOLAM HYDROCHLORIDE 1 MG/ML
2 INJECTION, SOLUTION INTRAMUSCULAR; INTRAVENOUS
Status: COMPLETED | OUTPATIENT
Start: 2020-01-11 | End: 2020-01-11

## 2020-01-11 RX ORDER — SODIUM CHLORIDE, SODIUM LACTATE, POTASSIUM CHLORIDE, CALCIUM CHLORIDE 600; 310; 30; 20 MG/100ML; MG/100ML; MG/100ML; MG/100ML
75 INJECTION, SOLUTION INTRAVENOUS CONTINUOUS
Status: CANCELLED | OUTPATIENT
Start: 2020-01-11

## 2020-01-11 RX ORDER — OXYCODONE HYDROCHLORIDE 5 MG/1
10 TABLET ORAL
Status: CANCELLED | OUTPATIENT
Start: 2020-01-11 | End: 2020-01-12

## 2020-01-11 RX ORDER — MAGNESIUM SULFATE HEPTAHYDRATE 40 MG/ML
2 INJECTION, SOLUTION INTRAVENOUS ONCE
Status: COMPLETED | OUTPATIENT
Start: 2020-01-11 | End: 2020-01-11

## 2020-01-11 RX ORDER — MIDAZOLAM HYDROCHLORIDE 1 MG/ML
INJECTION, SOLUTION INTRAMUSCULAR; INTRAVENOUS
Status: ACTIVE
Start: 2020-01-11 | End: 2020-01-11

## 2020-01-11 RX ORDER — LIDOCAINE HYDROCHLORIDE 10 MG/ML
0.1 INJECTION INFILTRATION; PERINEURAL AS NEEDED
Status: CANCELLED | OUTPATIENT
Start: 2020-01-11

## 2020-01-11 RX ORDER — METOCLOPRAMIDE HYDROCHLORIDE 5 MG/ML
5 INJECTION INTRAMUSCULAR; INTRAVENOUS
Status: DISCONTINUED | OUTPATIENT
Start: 2020-01-11 | End: 2020-01-12 | Stop reason: HOSPADM

## 2020-01-11 RX ORDER — SODIUM CHLORIDE, SODIUM LACTATE, POTASSIUM CHLORIDE, CALCIUM CHLORIDE 600; 310; 30; 20 MG/100ML; MG/100ML; MG/100ML; MG/100ML
1000 INJECTION, SOLUTION INTRAVENOUS CONTINUOUS
Status: CANCELLED | OUTPATIENT
Start: 2020-01-11 | End: 2020-01-12

## 2020-01-11 RX ADMIN — DOCUSATE SODIUM 100 MG: 100 CAPSULE, LIQUID FILLED ORAL at 09:15

## 2020-01-11 RX ADMIN — Medication 2 G: at 04:30

## 2020-01-11 RX ADMIN — Medication 10 ML: at 03:31

## 2020-01-11 RX ADMIN — FENTANYL CITRATE 100 MCG: 50 INJECTION INTRAMUSCULAR; INTRAVENOUS at 08:01

## 2020-01-11 RX ADMIN — Medication 2 G: at 14:04

## 2020-01-11 RX ADMIN — Medication 5 ML: at 22:00

## 2020-01-11 RX ADMIN — HYDROMORPHONE HYDROCHLORIDE 2 MG: 2 TABLET ORAL at 17:23

## 2020-01-11 RX ADMIN — MIDAZOLAM 2 MG: 1 INJECTION INTRAMUSCULAR; INTRAVENOUS at 08:02

## 2020-01-11 RX ADMIN — HYDROMORPHONE HYDROCHLORIDE 4 MG: 4 TABLET ORAL at 20:54

## 2020-01-11 RX ADMIN — HYDROMORPHONE HYDROCHLORIDE 1 MG: 1 INJECTION, SOLUTION INTRAMUSCULAR; INTRAVENOUS; SUBCUTANEOUS at 15:26

## 2020-01-11 RX ADMIN — MAGNESIUM SULFATE HEPTAHYDRATE 2 G: 40 INJECTION, SOLUTION INTRAVENOUS at 05:10

## 2020-01-11 RX ADMIN — Medication 10 ML: at 06:15

## 2020-01-11 RX ADMIN — FERROUS SULFATE TAB 325 MG (65 MG ELEMENTAL FE) 325 MG: 325 (65 FE) TAB at 09:15

## 2020-01-11 RX ADMIN — HYDROMORPHONE HYDROCHLORIDE 1 MG: 1 INJECTION, SOLUTION INTRAMUSCULAR; INTRAVENOUS; SUBCUTANEOUS at 16:29

## 2020-01-11 RX ADMIN — HYDROMORPHONE HYDROCHLORIDE 2 MG: 2 TABLET ORAL at 10:33

## 2020-01-11 RX ADMIN — METOCLOPRAMIDE 5 MG: 5 INJECTION, SOLUTION INTRAMUSCULAR; INTRAVENOUS at 11:37

## 2020-01-11 RX ADMIN — HYDROMORPHONE HYDROCHLORIDE 4 MG: 4 TABLET ORAL at 23:54

## 2020-01-11 RX ADMIN — IBUPROFEN 800 MG: 800 TABLET, FILM COATED ORAL at 17:23

## 2020-01-11 RX ADMIN — HYDROMORPHONE HYDROCHLORIDE 2 MG: 2 TABLET ORAL at 14:04

## 2020-01-11 RX ADMIN — HYDROMORPHONE HYDROCHLORIDE 1 MG: 1 INJECTION, SOLUTION INTRAMUSCULAR; INTRAVENOUS; SUBCUTANEOUS at 03:25

## 2020-01-11 RX ADMIN — HYDROMORPHONE HYDROCHLORIDE 1 MG: 1 INJECTION, SOLUTION INTRAMUSCULAR; INTRAVENOUS; SUBCUTANEOUS at 06:14

## 2020-01-11 NOTE — PROGRESS NOTES
PC to POA afterhours. Critical lab value Mg 1.9. Dr. Maki Pagan to call back. Will monitor. Addendum:  VO received for Magnesium. Read back and verified.

## 2020-01-11 NOTE — PROGRESS NOTES
TRANSFER - OUT REPORT:    Verbal report given to Meg Zavaleta RN(name) on Lucio Jackson  being transferred to OR(unit) for ordered procedure       Report consisted of patients Situation, Background, Assessment and   Recommendations(SBAR). Information from the following report(s) SBAR, Kardex, Intake/Output, MAR and Recent Results was reviewed with the receiving nurse. Lines:   Peripheral IV 01/10/20 Anterior; Left Forearm (Active)   Site Assessment Clean, dry, & intact 1/11/2020  1:35 AM   Phlebitis Assessment 0 1/11/2020  1:35 AM   Infiltration Assessment 0 1/11/2020  1:35 AM   Dressing Status Clean, dry, & intact 1/11/2020  1:35 AM   Dressing Type Tape;Transparent 1/11/2020  1:35 AM   Hub Color/Line Status Capped 1/11/2020  1:35 AM        Opportunity for questions and clarification was provided.

## 2020-01-11 NOTE — PROGRESS NOTES
Problem: Mobility Impaired (Adult and Pediatric)  Goal: *Acute Goals and Plan of Care (Insert Text)  Description  GOALS (1-4 days):  (1.)  Patient will move from supine to sit and sit to supine  in bed with INDEPENDENT. (2.)  Patient will transfer from bed to chair and chair to bed with SUPERVISION using the least restrictive device. (3.)  Patient will ambulate with SUPERVISION for 200 feet with the least restrictive device. (4.)  Patient will be safe with shoulder HEP, with caregiver's help to increase range of motion per MD orders. ________________________________________________________________________________________________   Outcome: Progressing Towards Goal     PHYSICAL THERAPY: Initial Assessment, Treatment Day: Day of Assessment, and AM 1/11/2020  INPATIENT: Hospital Day: 2  Payor: Loreta Lam / Plan: [id number] [place] [address] / Product Type: Workers Comp /      NAME/AGE/GENDER: Va Kerns is a 64 y.o. male   PRIMARY DIAGNOSIS: Rotator cuff arthropathy, right [M12.811]  Rotator cuff arthropathy, right [M12.811]  Arthritis of right shoulder region [M19.011]  Rupture long head biceps tendon, right, initial encounter [S46.111A] Osteoarthritis of right glenohumeral joint Osteoarthritis of right glenohumeral joint  Procedure(s) (LRB):  RIGHT SHOULDER REBLOCK (Right)  Day of Surgery  ICD-10: Treatment Diagnosis:   Pain in Right Shoulder (M25.511)  Stiffness of Right Shoulder, Not elsewhere classified (M25.611)  Generalized Muscle Weakness (M62.81)  Difficulty in walking, Not elsewhere classified (R26.2)   Precaution/Allergies:  Patient has no known allergies. ASSESSMENT:     Mr. Brandee Landaverde presents with painful & stiff right shoulder, pt was re-blocked this am, along with mildly unsteady gait & transfers. This pt will benefit from follow up therapy to establish HEP & to help restore safe functional mobility prior to returning home with caregiver.     This section established at most recent assessment   PROBLEM LIST (Impairments causing functional limitations):  Decreased [place] with Bed Mobility  Decreased [place] with Transfers  Decreased [place] with Ambulation   Decreased [place] with shoulder HEP   INTERVENTIONS PLANNED: (Benefits and precautions of physical therapy have been discussed with the patient.)  Bed Mobility Training  Transfer Training  Gait Training  Therapeutic Exercises per MD orders  Modalities for Pain     TREATMENT PLAN: Frequency/Duration: twice daily for duration of hospital stay  Rehabilitation Potential For Stated Goals: Good     RECOMMENDED REHABILITATION/EQUIPMENT: (at time of discharge pending progress): Continue Skilled Therapy and Home Health: Physical Therapy. HISTORY:   History of Present Injury/Illness (Reason for Referral):  Reverse right TSA  Past Medical History/Comorbidities:   Mr. Morales  has a past medical history of Drug addiction ([address] Eczema, Hiatal hernia, Kidney stones, Osteoarthritis of right glenohumeral joint (1/5/2020), and Rotator cuff tear. He also has no past medical history of Aneurysm ([address] Arrhythmia, Asthma, Autoimmune disease ([address] CAD (coronary artery disease), Cancer ([address] Chronic kidney disease, Chronic pain, Coagulation defects, COPD, Diabetes ([address] GERD (gastroesophageal reflux disease), Heart failure ([address] Hypertension, Liver disease, Psychiatric disorder, PUD (peptic ulcer disease), Seizures ([address] Stroke ([address] Thromboembolus ([address] or Thyroid disease. Mr. Morales  has a past surgical history that includes hx knee arthroscopy (Right); upper arm/elbow surgery unlisted (Left); and hx orthopaedic (Left, 1990's).   Social History/Living Environment:   Home Environment: Private residence  # Steps to Enter: 4  Rails to Enter: Yes  Hand Rails : Bilateral  One/Two Story Residence: One story  Living Alone: No  Support Systems: Spouse/Significant Other/Partner  Patient Expects to be Discharged to[de-identified] Private residence  Prior Level of Function/Work/Activity:  Pt was independent without an assisted device prior to this admission   Number of Personal Factors/Comorbidities that affect the Plan of Care: 3+: HIGH COMPLEXITY   EXAMINATION:   Most Recent Physical Functioning:   Gross Assessment:  AROM: Within functional limits(left UE & both LE's)  Strength: Within functional limits(left UE & both LE's)  Coordination: Within functional limits(left UE & both LE's)                    Balance:  Sitting: Intact; Without support  Standing: Impaired; Without support Bed Mobility:  Supine to Sit: Contact guard assistance  Sit to Supine: (NT)  Scooting: Contact guard assistance       Transfers:  Sit to Stand: Contact guard assistance  Stand to Sit: Contact guard assistance  Bed to Chair: Contact guard assistance  Gait:     Speed/Radha: Delayed  Step Length: Left shortened;Right shortened  Gait Abnormalities: Decreased step clearance  Distance (ft): 100 Feet (ft)  Ambulation - Level of Assistance: Contact guard assistance   Functional Mobility:         Gait/Ambulation:  cga        Transfers:  cga        Bed Mobility:  cga   Body Structures Involved:  Joints  Muscles Body Functions Affected:  Sensory/Pain  Movement Related Activities and Participation Affected:  General Tasks and Demands  Mobility   Number of elements that affect the Plan of Care: 4+: HIGH COMPLEXITY   CLINICAL PRESENTATION:   Presentation: Stable and uncomplicated: LOW COMPLEXITY   CLINICAL DECISION MAKIN [facility] [address] [address] AM-PAC 6 Clicks   Basic Mobility Inpatient Short Form  How much difficulty does the patient currently have. .. Unable A Lot A Little None   1. Turning over in bed (including adjusting bedclothes, sheets and blankets)? [] 1   [] 2   [x] 3   [] 4   2. Sitting down on and standing up from a chair with arms ( e.g., wheelchair, bedside commode, etc.)   [] 1   [] 2   [x] 3   [] 4   3.   Moving from lying on back to sitting on the side of the bed? [] 1   [] 2   [x] 3   [] 4   How much help from another person does the patient currently need. .. Total A Lot A Little None   4. Moving to and from a bed to a chair (including a wheelchair)? [] 1   [] 2   [x] 3   [] 4   5. Need to walk in hospital room? [] 1   [] 2   [x] 3   [] 4   6. Climbing 3-5 steps with a railing? [x] 1   [] 2   [] 3   [] 4   © 2007, Trustees of [address] [place] [address] [address] [address], under license to [employer]. All rights reserved      Score:  Initial: 16 Most Recent: X (Date: -- )    Interpretation of Tool:  Represents activities that are increasingly more difficult (i.e. Bed mobility, Transfers, Gait). Medical Necessity:     Patient is expected to demonstrate progress in   strength, balance, coordination, and functional technique   to   decrease assistance required with bed mobility, transfers & gait   . Reason for Services/Other Comments:  Patient continues to require skilled intervention due to   Pt not independent with functional mobility. .   Use of outcome tool(s) and clinical judgement create a POC that gives a: Difficult prediction of patient's progress: HIGH COMPLEXITY            TREATMENT:   (In addition to Assessment/Re-Assessment sessions the following treatments were rendered)   Pre-treatment Symptoms/Complaints:  numb right arm  Pain: Initial: numeric scale  Pain Intensity 1: 0  Pain Location 1: Shoulder  Pain Orientation 1: Right  Pain Intervention(s) 1: (re-block)  Post Session:  0/10     Therapeutic Exercise: (12 Minutes):  Exercises per grid below to improve mobility and dynamic movement of arm - right to improve functional endurance . Required minimal verbal cues to promote proper body alignment and promote proper body mechanics. Progressed repetitions as indicated.   Assessment/ 11 min     Date:  1/11 Date:   Date:     ACTIVITY/EXERCISE AM PM AM PM AM PM   Gripping 15p        Wrist Flexion/Extension 15p        Wrist Ulnar/Radial Deviation Pronation/Supination 15p        Elbow Flexion/Extension 15p        Shoulder Flexion/Extension 15p        Shoulder AB/ADduction         Shoulder IR/ER         Pulleys         Pendulums 15p CW & CCW        Shrugs         Isometric:                 Flexion         Extension         ABduction         ADduction         Biceps/Triceps                  B = bilateral; AA = active assistive; A = active; P = passive  Education:  [x]  Home Exercises  [x]  Sling Application   [x]  Movement Precautions   []  Pulleys   []  Use of Ice   []  Other:   Treatment/Session Assessment:    Response to Treatment:  tolerated well  Interdisciplinary Collaboration:   Registered Nurse  After treatment position/precautions:   Up in chair  Caregiver at bedside  Call light within reach  RN notified  Family at bedside   Compliance with Program/Exercises: Will assess as treatment progresses. Recommendations/Intent for next treatment session:  Treatment next visit will focus on increasing Mr. Castros independence with bed mobility, transfers, gait training, strength/ROM exercises, modalities for pain, and patient education.    Total Treatment Duration:  PT Patient Time In/Time Out  Time In: 1142  Time Out: [address], PT

## 2020-01-11 NOTE — PROGRESS NOTES
Telephone order from Camille Goldstein NP to increase dilaudid to 4mg as patient is having poor pain control

## 2020-01-11 NOTE — PROGRESS NOTES
Problem: Mobility Impaired (Adult and Pediatric)  Goal: *Acute Goals and Plan of Care (Insert Text)  Description  GOALS (1-4 days):  (1.)  Patient will move from supine to sit and sit to supine  in bed with INDEPENDENT. (2.)  Patient will transfer from bed to chair and chair to bed with SUPERVISION using the least restrictive device. (3.)  Patient will ambulate with SUPERVISION for 200 feet with the least restrictive device. (4.)  Patient will be safe with shoulder HEP, with caregiver's help to increase range of motion per MD orders. ________________________________________________________________________________________________   Outcome: Progressing Towards Goal     PHYSICAL THERAPY: Daily Note, Treatment Day: Day of Assessment and PM 1/11/2020  INPATIENT: Hospital Day: 2  Payor: Estrada Elizabeth / Plan: [id number] [place] [address] / Product Type: Workers Comp /      NAME/AGE/GENDER: Ronny Webb is a 64 y.o. male   PRIMARY DIAGNOSIS: Rotator cuff arthropathy, right [M12.811]  Rotator cuff arthropathy, right [M12.811]  Arthritis of right shoulder region [M19.011]  Rupture long head biceps tendon, right, initial encounter [S46.111A] Osteoarthritis of right glenohumeral joint Osteoarthritis of right glenohumeral joint  Procedure(s) (LRB):  RIGHT SHOULDER REBLOCK (Right)  Day of Surgery  ICD-10: Treatment Diagnosis:   · Pain in Right Shoulder (M25.511)  · Stiffness of Right Shoulder, Not elsewhere classified (M25.611)  · Generalized Muscle Weakness (M62.81)  · Difficulty in walking, Not elsewhere classified (R26.2)   Precaution/Allergies:  Patient has no known allergies. ASSESSMENT:     Mr. Morales presents with painful & stiff right shoulder, pt was re-blocked this am, along with mildly unsteady gait & transfers. This pt will benefit from follow up therapy to establish HEP & to help restore safe functional mobility prior to returning home with caregiver.   In pm session block appears to be wearing off. Pt reviewed HEP with spouse present & written guidelines provided. Pt push through activity slowly. This section established at most recent assessment   PROBLEM LIST (Impairments causing functional limitations):  1. Decreased [place] with Bed Mobility  2. Decreased [place] with Transfers  3. Decreased [place] with Ambulation   4. Decreased [place] with shoulder HEP   INTERVENTIONS PLANNED: (Benefits and precautions of physical therapy have been discussed with the patient.)  1. Bed Mobility Training  2. Transfer Training  3. Gait Training  4. Therapeutic Exercises per MD orders  5. Modalities for Pain     TREATMENT PLAN: Frequency/Duration: twice daily for duration of hospital stay  Rehabilitation Potential For Stated Goals: Good     RECOMMENDED REHABILITATION/EQUIPMENT: (at time of discharge pending progress): Continue Skilled Therapy and Home Health: Physical Therapy. HISTORY:   History of Present Injury/Illness (Reason for Referral):  Reverse right TSA  Past Medical History/Comorbidities:   Mr. Morales  has a past medical history of Drug addiction ([address] Eczema, Hiatal hernia, Kidney stones, Osteoarthritis of right glenohumeral joint (1/5/2020), and Rotator cuff tear. He also has no past medical history of Aneurysm ([address] Arrhythmia, Asthma, Autoimmune disease ([address] CAD (coronary artery disease), Cancer ([address] Chronic kidney disease, Chronic pain, Coagulation defects, COPD, Diabetes ([address] GERD (gastroesophageal reflux disease), Heart failure ([address] Hypertension, Liver disease, Psychiatric disorder, PUD (peptic ulcer disease), Seizures ([address] Stroke ([address] Thromboembolus ([address] or Thyroid disease. Mr. Morales  has a past surgical history that includes hx knee arthroscopy (Right); upper arm/elbow surgery unlisted (Left); and hx orthopaedic (Left, 1990's).   Social History/Living Environment:   Home Environment: Private residence  # Steps to Enter: 4  Rails to Enter: Yes  Hand Rails : Bilateral  One/Two Story Residence: One story  Living Alone: No  Support Systems: Spouse/Significant Other/Partner  Patient Expects to be Discharged to[de-identified] Private residence  Prior Level of Function/Work/Activity:  Pt was independent without an assisted device prior to this admission   Number of Personal Factors/Comorbidities that affect the Plan of Care: 3+: HIGH COMPLEXITY   EXAMINATION:   Most Recent Physical Functioning:   Gross Assessment:  AROM: Within functional limits(left UE & both LE's)  Strength: Within functional limits(left UE & both LE's)  Coordination: Within functional limits(left UE & both LE's)                    Balance:  Sitting: Intact; Without support  Standing: Impaired; Without support Bed Mobility:  Supine to Sit: (NT)  Sit to Supine: (NT)  Scooting: Stand-by assistance       Transfers:  Sit to Stand: Stand-by assistance  Stand to Sit: Stand-by assistance  Bed to Chair: Stand-by assistance  Gait:     Speed/Radha: Delayed  Step Length: Left shortened;Right shortened  Gait Abnormalities: Decreased step clearance  Distance (ft): 100 Feet (ft)  Ambulation - Level of Assistance: Stand-by assistance  Duration: 15 Minutes(extra time to work through activity noted)   Functional Mobility:         Gait/Ambulation:  sba        Transfers:  sba        Bed Mobility:  NT   Body Structures Involved:  1. Joints  2. Muscles Body Functions Affected:  1. Sensory/Pain  2. Movement Related Activities and Participation Affected:  1. General Tasks and Demands  2. Mobility   Number of elements that affect the Plan of Care: 4+: HIGH COMPLEXITY   CLINICAL PRESENTATION:   Presentation: Stable and uncomplicated: LOW COMPLEXITY   CLINICAL DECISION MAKIN [facility] [address] [id number] AM-PAC 6 Clicks   Basic Mobility Inpatient Short Form  How much difficulty does the patient currently have. .. Unable A Lot A Little None   1.   Turning over in bed (including adjusting bedclothes, sheets and blankets)? [] 1   [] 2   [x] 3   [] 4   2. Sitting down on and standing up from a chair with arms ( e.g., wheelchair, bedside commode, etc.)   [] 1   [] 2   [x] 3   [] 4   3. Moving from lying on back to sitting on the side of the bed? [] 1   [] 2   [x] 3   [] 4   How much help from another person does the patient currently need. .. Total A Lot A Little None   4. Moving to and from a bed to a chair (including a wheelchair)? [] 1   [] 2   [x] 3   [] 4   5. Need to walk in hospital room? [] 1   [] 2   [x] 3   [] 4   6. Climbing 3-5 steps with a railing? [x] 1   [] 2   [] 3   [] 4   © 2007, Trustees of [facility] [employer], under license to [employer]. All rights reserved      Score:  Initial: 16 Most Recent: X (Date: -- )    Interpretation of Tool:  Represents activities that are increasingly more difficult (i.e. Bed mobility, Transfers, Gait). Medical Necessity:     · Patient is expected to demonstrate progress in   · strength, balance, coordination, and functional technique  ·  to   · decrease assistance required with bed mobility, transfers & gait   · .  Reason for Services/Other Comments:  · Patient continues to require skilled intervention due to   · Pt not independent with functional mobility. · .   Use of outcome tool(s) and clinical judgement create a POC that gives a: Difficult prediction of patient's progress: HIGH COMPLEXITY            TREATMENT:   (In addition to Assessment/Re-Assessment sessions the following treatments were rendered)   Pre-treatment Symptoms/Complaints:  Poor pain control, RN informed  Pain: Initial: numeric scale  Pain Intensity 1: 6  Pain Location 1: Shoulder  Pain Orientation 1: Right  Pain Intervention(s) 1: Exercise, Repositioned  Post Session:  6/10     Therapeutic Exercise: (15 Minutes):  Exercises per grid below to improve mobility and dynamic movement of arm - right to improve functional endurance .   Required minimal verbal cues to promote proper body Pulmonary nodule  02/21/2019  EMANUEL, h/o sarcoma  Active  Merissa Barrios alignment and promote proper body mechanics. Progressed repetitions as indicated. Gait Training (15 Minutes(extra time to work through activity noted)):  Gait training to improve and/or restore physical functioning as related to mobility, strength, balance, coordination and dynamic movement of leg - bilateral to improve functional gait. Ambulated 100 Feet (ft) with Stand-by assistance using no device and minimal   related to their stride length and heel strike to promote proper body alignment, promote proper body posture and promote proper body mechanics. Date:  1/11 Date:   Date:     ACTIVITY/EXERCISE AM PM AM PM AM PM   Gripping 15p 15       Wrist Flexion/Extension 15p 15       Wrist Ulnar/Radial Deviation         Pronation/Supination 15p 15       Elbow Flexion/Extension 15p 15aa       Shoulder Flexion/Extension 15p 15aa in flex to tolerance       Shoulder AB/ADduction         Shoulder IR/ER         Pulleys         Pendulums 15p CW & CCW 15aa CW & CCW       Shrugs         Isometric:                 Flexion         Extension         ABduction         ADduction         Biceps/Triceps                  B = bilateral; AA = active assistive; A = active; P = passive  Education:  [x]  Home Exercises  [x]  Sling Application   [x]  Movement Precautions   []  Pulleys   [x]  Use of Ice   []  Other:   Treatment/Session Assessment:    · Response to Treatment:  Pt is motivated & hard working, should do well once pain is in better control  · Interdisciplinary Collaboration:   o Registered Nurse  · After treatment position/precautions:   o Up in chair  o Caregiver at bedside  o Call light within reach  o RN notified  o Family at bedside   · Compliance with Program/Exercises: Will assess as treatment progresses. · Recommendations/Intent for next treatment session:  Treatment next visit will focus on increasing Mr. Castros independence with bed mobility, transfers, gait training, strength/ROM exercises, modalities for pain, and patient education.    Total Treatment Duration:  PT Patient Time In/Time Out  Time In: 1528  Time Out: [address] Ern[address] Jovel, PT

## 2020-01-11 NOTE — OP NOTES
[id number] [address]  OPERATIVE REPORT    Name:  Larisa Ball  MR#:  [id number]  :  1960  ACCOUNT #:  [de-identified]  DATE OF SERVICE:  01/10/2020    PREOPERATIVE DIAGNOSES:  1. Right shoulder sprain. 2.  Rotator cuff sprain, right shoulder. 3.  Bicipital strain, right shoulder. 4.  Superior labrum anterior posterior tear, right shoulder. 5.  Glenohumeral osteoarthritis, right shoulder. POSTOPERATIVE DIAGNOSES:  1. Right shoulder sprain. 2.  Rotator cuff sprain, right shoulder. 3.  Bicipital strain, right shoulder. 4.  Superior labrum anterior posterior tear, right shoulder. 5.  Glenohumeral osteoarthritis, right shoulder. PROCEDURE PERFORMED:  Reverse right shoulder total arthroplasty with a Delta Xtend prosthesis. SURGEON:  Weston Vu. Jeanette Weiner MD    ASSISTANT:  First assistant [place]. ESTEFANY Hopper. Use of a first assistant was necessary due to the complexity of the operative procedure. First assistant was present during the entire procedure. First assistant facilitated in exposure, placement of the prosthesis. Use of first assistant was necessary to optimize the patient's safety and outcome. ANESTHESIA:  General with interscalene block. COMPLICATIONS:  None. IMPLANTS:  Hardware utilized was DePuy +10 metaglene 42 eccentric +4 mm lateralized glenoid, 42 +9 cup, a 12.2 modular head epiphysis and stem which was cemented and a 12 mm Biostop G, 60 mm superior, 40 mm inferior, 18 mm anterior and posterior nonlocking cortical screws. ESTIMATED BLOOD LOSS:  150 mL. PATHOLOGY:  1.  Bicipital strain. 2.  SLAP tear. 3.  Full thickness superior and infraspinatus rotator cuff tear as well as portion of the subscapularis, which is torn. 4.  Degenerative changes of the glenohumeral joint. CPT CODES:  58333 with a modifier AS for assistant surgeon. ICD-10 CODES:  C88.862, S46.011, S46.111, S43.431, M19.011.     INDICATIONS:  The patient is a 53-year-old gentleman who injured his right shoulder on the job. Preoperative physical exam and radiographs and an MRI demonstrate rotator cuff sprain, bicipital strain, SLAP tear as well as rotator cuff tear and degenerative changes in the right shoulder. The patient has exhausted nonoperative modalities and electively admitted for operative intervention. PROCEDURE:  Following identification of the patient, the patient was taken to the operative suite. Following administration of general anesthesia, interscalene block for postop pain control, 2 g of IV Ancef, the patient was then very carefully positioned on the operating room table in the beach-chair fashion. Right shoulder was the prepped and draped in sterile fashion. Standard deltopectoral incision was identified and marked. InteguSeal was applied. Once the InteguSeal was allowed to cure, the skin was incised. Subcutaneous tissue was then dissected down the cephalic vein. This was dissected proximally and distally and retracted laterally with deltoid, pec major and strap muscles were retracted medially. The biceps tendon was identified. It was dissected free. It was noted to be markedly torn and had marked tendinopathy. It was dissected free, was tacked and transected for later tenodesis. At this point, it was noted that the patient had a tear involving all supra and infraspinatus and the subscapularis. The remaining subscapularis was elevated off the lesser tuberosity in a subscapularis type peel configuration and tagged. The humerus was very carefully dislocated from the wound. There were marked degenerative changes of the humeral head and glenoid with marked posterior glenoid wear. Proximal cutting guide was assembled. Proximal cut was then made. Circumferential osteophytes were then resected. Axillary nerve was protected. Subdeltoid bursa was then released and again the axillary nerve was protected. At this point, attention was then turned to the glenoid. The glenoid was then meticulously exposed. There was marked degenerative changes with posterior wear. Starter wire was then drilled. The glenoid was then drilled and reamed. The high side was reamed down. The glenoid was drilled to a +10 mm depth. At this point, the +10 metaglene was inserted and secured with a 60 mm superior, 40 mm inferior, and 18 mm anterior, 18 mm inferior nonlocking cortical screw. Metaglene was stable. A 42 eccentric +4 mm lateralized glenosphere was then inserted and secured in the standard fashion. Metaglene and glenosphere were stable. Humerus was then dislocated back from the wound and reamed to accommodate a 12.2 cut press-fit stem. Initially on the trial the press-fit was stable. The shoulder was reduced with a 42 +9 cup. The shoulder was reduced and gave excellent stability and mobility. At this point, all trial components were then removed. The 12.2 Porocoat press-fit modular component was then assembled on the back table and tightened with the head in the neutral position. Once the prosthesis was assembled, it was then attempted to press-fit the true prosthesis into place. However it was noted that press-fit was not stable. At this point, it was elected to cement the prosthesis. The humeral canal was irrigated, dried and a 12 mm Biostop G was then placed distally. The cement was mixed. Cement was inserted in the humeral canal and a 12.2 modular Porocoat was then cemented in the appropriate version. Excessive cement was removed with a curette. The collar of the prosthesis have been loaded with #5 sutures. Once this cement was cured, the true 42+9 cup was inserted. Shoulder was reduced. There was excellent stability with excellent mobility. At this point, the subscapularis were repaired back to the fins of the prosthesis using #5 Mersilene sutures. The arm was put through range of motion and stable. The biceps was then tenodesed using #5 Mersilene sutures. Again, the arm was put through range of motion. The axillary and radial nerves were intact. Shoulder was stable. The wound was then irrigated. The deltopectoral interval was approximated with #2 Mersilene suture. Skin was closed with 0 Vicryl figure-of-eight sutures and a 2-0 Prolene subcuticular stitch. A sterile dressing was applied. A sling and swathe was applied. The patient was then transferred to the recovery  room in stable condition. This injury was secondary to a workplace injury.         MD CHRISTINA [email or web address]  D:  01/10/2020 16:02  T:  01/11/2020 2:08  JOB #:  [id number]  CC:  MD Lynda Amezcua NP

## 2020-01-11 NOTE — PROGRESS NOTES
RN to patient room to administer IV pain medication. IV site will not flush. Patient declined IM shot stating he would wait for new IV to be started.

## 2020-01-11 NOTE — PERIOP NOTES
Blood pressure 124/81, pulse 69, temperature 36.4 °C (97.6 °F), resp. rate 16, height 5' 9\" (1.753 m), weight 77.9 kg (171 lb 11.2 oz), SpO2 97 %. , Alert and oriented, pain well controlled, Follow up per surgeon. No anesthetic complications, ready for discharge to Transfer to floor.

## 2020-01-11 NOTE — PROGRESS NOTES
Shift assessment completed. Pt is alert & oriented x4. Able to verbalize needs. Resting quietly with no distress noted. Dressing to right shoulder is clean, dry and intact. Ice pack provided. Neurovascular and peripheral vascular checks WNL. Incentive Spirometry at bedside. Patient encouraged to use hourly x 10 repetitions. Patient voiding clear yellow urine without difficulty. Pain is being managed with Dilaudid with patient tolerating well. Bed low and locked. Call light within reach. Patient instructed to call for assistance. Pt verbalizes understanding. Will monitor.

## 2020-01-11 NOTE — ROUTINE PROCESS
TRANSFER - IN REPORT:    Verbal report received from pacu(name) on Alexey Pace  being received from pacu(unit) for routine progression of care      Report consisted of patients Situation, Background, Assessment and   Recommendations(SBAR). Information from the following report(s) Procedure Summary was reviewed with the receiving nurse. Opportunity for questions and clarification was provided. Assessment completed upon patients arrival to unit and care assumed.

## 2020-01-11 NOTE — PROGRESS NOTES
#22G IV HW started in left forearm with excellent blood return on second attempt. Pt. Tolerated with no difficulty. D/C'd IV to left hand with tip of IV catheter intact. No redness or swelling at left hand site.

## 2020-01-11 NOTE — PROGRESS NOTES
Care Management Interventions  PCP Verified by CM: Yes  Transition of Care Consult (CM Consult): [address]: Yes  Current Support Network: Other  The Plan for Transition of Care is Related to the Following Treatment Goals :  Increase strength  The Patient and/or Patient Representative was Provided with a Choice of Provider and Agrees with the Discharge Plan?: Yes  Freedom of Choice List was Provided with Basic Dialogue that Supports the Patient's Individualized Plan of Care/Goals, Treatment Preferences and Shares the Quality Data Associated with the Providers?: Yes  Discharge Location  Discharge Placement: Home with home health

## 2020-01-11 NOTE — PROGRESS NOTES
Orthopedic Joint Progress Note    2020  Admit Date: 1/10/2020  Admit Diagnosis: Rotator cuff arthropathy, right [M12.811]  Rotator cuff arthropathy, right [M12.811]  Arthritis of right shoulder region [M19.011]  Rupture long head biceps tendon, right, initial encounter [S46.111A]    Day of Surgery    Subjective: Melburn Goshen C/O HICCUPS AND PAIN      Objective:     PT/OT:     PATIENT MOBILITY                           Vital Signs:    Blood pressure 124/81, pulse 69, temperature 97.6 °F (36.4 °C), resp. rate 16, height 5' 9\" (1.753 m), weight 77.9 kg (171 lb 11.2 oz), SpO2 97 %.   Temp (24hrs), Av.9 °F (36.6 °C), Min:96 °F (35.6 °C), Max:98.7 °F (37.1 °C)      Pain Control:   Pain Assessment  Pain Scale 1: Numeric (0 - 10)  Pain Intensity 1: 10  Pain Location 1: Shoulder  Pain Orientation 1: Right  Pain Description 1: Aching, Dull, Constant  Pain Intervention(s) 1: Medication (see MAR)    Meds:  Current Facility-Administered Medications   Medication Dose Route Frequency    fentaNYL citrate (PF) 50 mcg/mL injection        midazolam (VERSED) 1 mg/mL injection        magnesium sulfate 2 g/50 ml IVPB (premix or compounded)  2 g IntraVENous ONCE    ibuprofen (MOTRIN) tablet 800 mg  800 mg Oral Q6H PRN    tuberculin injection 5 Units  5 Units IntraDERMal ONCE    bisacodyl (DULCOLAX) suppository 10 mg  10 mg Rectal DAILY PRN    docusate sodium (COLACE) capsule 100 mg  100 mg Oral DAILY    ferrous sulfate tablet 325 mg  1 Tab Oral DAILY WITH BREAKFAST    HYDROmorphone (DILAUDID) tablet 2 mg  2 mg Oral Q3H PRN    magnesium citrate solution 296 mL  296 mL Oral DAILY PRN    sodium phosphate (FLEET'S) enema 1 Enema  1 Enema Rectal DAILY PRN    temazepam (RESTORIL) capsule 15 mg  15 mg Oral QHS PRN    0.9% sodium chloride infusion  75 mL/hr IntraVENous CONTINUOUS    sodium chloride (NS) flush 5-40 mL  5-40 mL IntraVENous Q8H    sodium chloride (NS) flush 5-40 mL  5-40 mL IntraVENous PRN  acetaminophen (TYLENOL) tablet 650 mg  650 mg Oral Q4H PRN    HYDROmorphone (PF) (DILAUDID) injection 1 mg  1 mg IntraVENous Q1H PRN    ceFAZolin (ANCEF) 2 g/20 mL in sterile water IV syringe  2 g IntraVENous Q8H    ondansetron (ZOFRAN) injection 4 mg  4 mg IntraVENous Q4H PRN        LAB:    Lab Results   Component Value Date/Time    INR 1.0 01/03/2020 12:37 PM     Lab Results   Component Value Date/Time    HGB 11.3 (L) 01/11/2020 03:36 AM    HGB 14.3 01/03/2020 12:37 PM       Wound Shoulder Right (Active)   Dressing Status Clean, dry, and intact 1/11/2020  8:30 AM   Dressing Type Aquacel 1/11/2020  8:30 AM   Splint Type/Material Shoulder Immobilizer;Sling 1/10/2020  3:18 PM   Drainage Amount None 1/10/2020  3:18 PM   Number of days: 1         Physical Exam:  No significant changes  Sling in place  Dressing c/d/i  Assessment:      Principal Problem:    Osteoarthritis of right glenohumeral joint (1/5/2020)    Active Problems:    Traumatic tear of right rotator cuff (1/5/2020)      Strain of muscle, fascia and tendon of long head of biceps, right arm, initial encounter (1/5/2020)      Superior glenoid labrum lesion of right shoulder (1/5/2020)      Arthritis of right shoulder region (1/10/2020)      Rupture long head biceps tendon, right, initial encounter (1/10/2020)         Plan:     Continue PT/OT/Rehab  Low mag, replacement ordered will recheck  Reglan for hiccups

## 2020-01-11 NOTE — PROCEDURES
Peripheral Block    Start time: 1/11/2020 8:01 AM  End time: 1/11/2020 8:07 AM  Performed by: Carlos Molina MD  Authorized by: Carlos Molina MD       Pre-procedure: Indications: at surgeon's request, post-op pain management and procedure for pain    Preanesthetic Checklist: patient identified, risks and benefits discussed, site marked, timeout performed, anesthesia consent given and patient being monitored    Timeout Time: 08:01          Block Type:   Block Type:   Interscalene  Laterality:  Right  Monitoring:  Standard ASA monitoring, continuous pulse ox, frequent vital sign checks, heart rate, oxygen and responsive to questions  Injection Technique:  Single shot  Procedures: ultrasound guided and nerve stimulator    Patient Position: supine  Prep: chlorhexidine    Location:  Interscalene  Needle Type:  Stimuplex  Needle Gauge:  21 G  Needle Localization:  Ultrasound guidance and anatomical landmarks  Motor Response: minimal motor response >0.4 mA      Assessment:  Number of attempts:  1  Injection Assessment:  Incremental injection every 5 mL, local visualized surrounding nerve on ultrasound, negative aspiration for blood, negative aspiration for CSF, no paresthesia, no intravascular symptoms and ultrasound image on chart  Patient tolerance:  Patient tolerated the procedure well with no immediate complications

## 2020-01-12 ENCOUNTER — HOME HEALTH ADMISSION (OUTPATIENT)
Dept: HOME HEALTH SERVICES | Facility: HOME HEALTH | Age: 60
End: 2020-01-12
Payer: OTHER MISCELLANEOUS

## 2020-01-12 VITALS
DIASTOLIC BLOOD PRESSURE: 82 MMHG | HEART RATE: 62 BPM | BODY MASS INDEX: 25.43 KG/M2 | HEIGHT: 69 IN | SYSTOLIC BLOOD PRESSURE: 138 MMHG | OXYGEN SATURATION: 95 % | WEIGHT: 171.7 LBS | TEMPERATURE: 97.4 F | RESPIRATION RATE: 16 BRPM

## 2020-01-12 LAB
ANION GAP SERPL CALC-SCNC: 5 MMOL/L (ref 7–16)
BUN SERPL-MCNC: 11 MG/DL (ref 6–23)
CALCIUM SERPL-MCNC: 8.1 MG/DL (ref 8.3–10.4)
CHLORIDE SERPL-SCNC: 99 MMOL/L (ref 98–107)
CO2 SERPL-SCNC: 29 MMOL/L (ref 21–32)
CREAT SERPL-MCNC: 0.94 MG/DL (ref 0.8–1.5)
ERYTHROCYTE [DISTWIDTH] IN BLOOD BY AUTOMATED COUNT: 13 % (ref 11.9–14.6)
GLUCOSE SERPL-MCNC: 112 MG/DL (ref 65–100)
HCT VFR BLD AUTO: 33.4 % (ref 41.1–50.3)
HGB BLD-MCNC: 11.1 G/DL (ref 13.6–17.2)
MAGNESIUM SERPL-MCNC: 2.1 MG/DL (ref 1.8–2.4)
MCH RBC QN AUTO: 29.8 PG (ref 26.1–32.9)
MCHC RBC AUTO-ENTMCNC: 33.2 G/DL (ref 31.4–35)
MCV RBC AUTO: 89.5 FL (ref 79.6–97.8)
NRBC # BLD: 0 K/UL (ref 0–0.2)
PLATELET # BLD AUTO: 144 K/UL (ref 150–450)
PMV BLD AUTO: 11.5 FL (ref 9.4–12.3)
POTASSIUM SERPL-SCNC: 3.7 MMOL/L (ref 3.5–5.1)
RBC # BLD AUTO: 3.73 M/UL (ref 4.23–5.6)
SODIUM SERPL-SCNC: 133 MMOL/L (ref 136–145)
WBC # BLD AUTO: 14.1 K/UL (ref 4.3–11.1)

## 2020-01-12 PROCEDURE — 74011250637 HC RX REV CODE- 250/637: Performed by: NURSE PRACTITIONER

## 2020-01-12 PROCEDURE — 74011250637 HC RX REV CODE- 250/637: Performed by: ORTHOPAEDIC SURGERY

## 2020-01-12 PROCEDURE — 36415 COLL VENOUS BLD VENIPUNCTURE: CPT

## 2020-01-12 PROCEDURE — 83735 ASSAY OF MAGNESIUM: CPT

## 2020-01-12 PROCEDURE — 85027 COMPLETE CBC AUTOMATED: CPT

## 2020-01-12 PROCEDURE — 80048 BASIC METABOLIC PNL TOTAL CA: CPT

## 2020-01-12 PROCEDURE — 97110 THERAPEUTIC EXERCISES: CPT

## 2020-01-12 RX ORDER — ONDANSETRON 8 MG/1
8 TABLET, ORALLY DISINTEGRATING ORAL
Status: DISCONTINUED | OUTPATIENT
Start: 2020-01-12 | End: 2020-01-12 | Stop reason: HOSPADM

## 2020-01-12 RX ADMIN — HYDROMORPHONE HYDROCHLORIDE 4 MG: 4 TABLET ORAL at 09:16

## 2020-01-12 RX ADMIN — DOCUSATE SODIUM 100 MG: 100 CAPSULE, LIQUID FILLED ORAL at 09:16

## 2020-01-12 RX ADMIN — ONDANSETRON 8 MG: 8 TABLET, ORALLY DISINTEGRATING ORAL at 10:40

## 2020-01-12 RX ADMIN — Medication 5 ML: at 03:00

## 2020-01-12 RX ADMIN — HYDROMORPHONE HYDROCHLORIDE 4 MG: 4 TABLET ORAL at 03:00

## 2020-01-12 NOTE — PROGRESS NOTES
Patient resting quietly in recliner with family at bedside, alert and oriented, no distress noted. Right shoulder dressing c/d/i, sling is in place and patient voiding clear yellow urine, ambulates to bathroom. Neurovascular and peripheral vascular checks WNL. Call light within reach. Patient instructed to call for assistance, verbalizes understanding. Nursing assessment complete.

## 2020-01-12 NOTE — DISCHARGE INSTRUCTIONS
DISCHARGE SUMMARY from Nurse    The following personal items collected during your admission are returned to you:   Dental Appliance: Dental Appliances: Lowers; Other (comment) (bridge )  Vision: Visual Aid: Glasses  Hearing Aid:   na  Jewelry: Jewelry: None  Clothing: Clothing: At bedside  Other Valuables: Other Valuables: None  Valuables sent to safe:   na          PATIENT INSTRUCTIONS:    New Medications:    Dilaudid 2 mg tabs Take 1-2 tabs every 4-6 hrs as needed for pain. Last dose: 916am  Phenergan 25 mg tabs Take 1 tab every 8 hrs as needed for nausea. Restoril 15 mg tabs Take 1 tab at bedtime as needed for sleep. After general anesthesia or intravenous sedation, for 24 hours or while taking prescription Narcotics:  · Limit your activities  · Do not drive and operate hazardous machinery  · Do not make important personal or business decisions  · Do  not drink alcoholic beverages  · If you have not urinated within 8 hours after discharge, please contact your surgeon on call. Report the following to your surgeon:  · Excessive pain, swelling, redness or odor of or around the surgical area  · Temperature over 101  · Nausea and vomiting lasting longer than 4 hours or if unable to take medications  · Any signs of decreased circulation or nerve impairment to extremity: change in color, persistent  numbness, tingling, coldness or increase pain  · Any questions, call office @ [phone]. What to do at Home:  Recommended activity: activity as tolerated, as instructed per Dr. Adam Pugh. Continue with exercises taught by Physical Therapy. Resume per hospital diet. Wear sling to right arm. Use ice and elevate arm to decrease pain and swelling. If you experience any of the following symptoms temp>101, pain unrelieved by meds, or persistent nausea or vomitting, please follow up with Dr. Adam Pugh @ [phone]. *  Please give a list of your current medications to your Primary Care Provider.     *  Please update this list whenever your medications are discontinued, doses are      changed, or new medications (including over-the-counter products) are added. *  Please carry medication information at all times in case of emergency situations. Shoulder Replacement Surgery: What to Expect at Home  Your Recovery  Shoulder replacement surgery replaces the worn parts of your shoulder joint. When you leave the hospital, your arm will be in a sling. It will be helpful if there is someone to help you at home for the next few weeks or until you have more energy and can move around better. You will go home with a bandage and stitches or staples. You can remove the bandage when your doctor tells you to. If the stitches are not the type that dissolve, your doctor will remove them in 10 to 14 days. You may still have some mild pain, and the area may be swollen for several months after surgery. Your doctor will give you medicine for the pain. You will continue the rehabilitation program (rehab) you started in the hospital. The better you do with your rehab exercises, the sooner you will get your strength and movement back. Depending on your job, you may be able to return to work as early as 2 to 3 weeks after surgery, as long as you avoid certain arm movements, such as lifting. This care sheet gives you a general idea about how long it will take for you to recover. But each person recovers at a different pace. Follow the steps below to get better as quickly as possible. How can you care for yourself at home? Activity  · Rest when you feel tired. You may take a nap, but don't stay in bed all day. · Work with your physical therapist to learn the best way to exercise. · You will have a sling to wear at night. And it's a good idea to also put a small stack of folded sheets or towels under your upper arm while you are in bed to keep your arm from dropping too far back.   · Your arm should stay next to your body or in front of it for several weeks, both while you are up and during sleep. · Don't lift anything with the affected arm for 6 weeks. · You may need to take sponge baths until your stitches or staples have been removed. You will probably be able to shower 24 hours after they are removed. · Ask your doctor when you can drive again. · Ask your doctor when it is okay for you to have sex. · Your doctor may advise you to give up activities that put stress on that shoulder. This includes sports such as weight lifting or tennis, unless your tennis arm was not the one affected. Diet  · By the time you leave the hospital, you will probably be eating your normal diet. If your stomach is upset, try bland, low-fat foods like plain rice, broiled chicken, toast, and yogurt. Your doctor may recommend that you take iron and vitamin supplements. · Drink plenty of fluids (unless your doctor tells you not to). · You may notice that your bowel movements are not regular right after your surgery. This is common. Try to avoid constipation and straining with bowel movements. You may want to take a fiber supplement every day. If you have not had a bowel movement after a couple of days, ask your doctor about taking a mild laxative. Medicines  · Be safe with medicines. Take pain medicines exactly as directed. ¨ If the doctor gave you a prescription medicine for pain, take it as prescribed. ¨ If you are not taking a prescription pain medicine, ask your doctor if you can take an over-the-counter medicine. · If you think your pain medicine is making you sick to your stomach:  ¨ Take your medicine after meals (unless your doctor has told you not to). ¨ Ask your doctor for a different pain medicine. · If your doctor prescribed antibiotics, take them as directed. Don't stop taking them just because you feel better. You need to take the full course of antibiotics.   · If you take a blood thinner, be sure you get instructions about how to take your medicine safely. Blood thinners can cause serious bleeding problems. Incision care  · You will have a dressing over the cut (incision). A dressing helps the incision heal and protects it. Your doctor will tell you how to take care of this. · Keep the area clean and dry. Exercise  · Shoulder rehabilitation is a series of exercises you do after your surgery. This helps you get back your shoulder's range of motion and strength. You will work with your doctor and physical therapist to plan this exercise program. To get the best results, you need to do the exercises correctly and as often and as long as your doctor tells you. Ice  · For pain, put ice or a cold pack on the area for 10 to 20 minutes at a time. Put a thin cloth between the ice and your skin. Other instructions  · Wear medical alert jewelry that says you may need antibiotics before any procedure, including dental work. You can buy this at most drugstores. Follow-up care is a key part of your treatment and safety. Be sure to make and go to all appointments, and call your doctor if you are having problems. It's also a good idea to know your test results and keep a list of the medicines you take. When should you call for help? Call 911 anytime you think you may need emergency care. For example, call if:  · You have severe trouble breathing. · You have symptoms of a blood clot in your lung (called a pulmonary embolism). These may include:  ¨ Sudden chest pain. ¨ Trouble breathing. ¨ Coughing up blood. Call your doctor now or seek immediate medical care if:  · You have severe or increasing pain. · You have symptoms of infection, such as:  ¨ Increased pain, swelling, warmth, or redness. ¨ Red streaks or pus. ¨ A fever. · You have tingling, weakness, or numbness in your arm. · Your arm turns cold or changes color. · You have symptoms of a blood clot in your leg (called a deep vein thrombosis).  These may include:  ¨ Pain in the calf, back of the knee, thigh, or groin. ¨ Redness and swelling in the leg or groin. Watch closely for changes in your health, and be sure to contact your doctor if:  · You do not get better as expected. Where can you learn more? Go to [employer]  Enter K663 in the search box to learn more about \"Shoulder Replacement Surgery: What to Expect at Home. \"   © 0103-2113 [employer], [employer]. Care instructions adapted under license by [employer] (which disclaims liability or warranty for this information). This care instruction is for use with your licensed healthcare professional. If you have questions about a medical condition or this instruction, always ask your healthcare professional. [address] any warranty or liability for your use of this information. Content Version: 0.7.472113; Last Revised: August 6, 2013                These are general instructions for a healthy lifestyle:    No smoking/ No tobacco products/ Avoid exposure to second hand smoke    Surgeon General's Warning:  Quitting smoking now greatly reduces serious risk to your health. Obesity, smoking, and sedentary lifestyle greatly increases your risk for illness    A healthy diet, regular physical exercise & weight monitoring are important for maintaining a healthy lifestyle    You may be retaining fluid if you have a history of heart failure or if you experience any of the following symptoms:  Weight gain of 3 pounds or more overnight or 5 pounds in a week, increased swelling in our hands or feet or shortness of breath while lying flat in bed. Please call your doctor as soon as you notice any of these symptoms; do not wait until your next office visit.     Recognize signs and symptoms of STROKE:    F-face looks uneven    A-arms unable to move or move unevenly    S-speech slurred or non-existent    T-time-call 911 as soon as signs and symptoms begin-DO NOT go       Back to bed or wait to see if you get better-TIME IS BRAIN. The discharge information has been reviewed with the patient. The patient verbalized understanding.

## 2020-01-12 NOTE — PROGRESS NOTES
Orthopedic Joint Progress Note    2020  Admit Date: 1/10/2020  Admit Diagnosis: Rotator cuff arthropathy, right [M12.811]  Rotator cuff arthropathy, right [M12.811]  Arthritis of right shoulder region [M19.011]  Rupture long head biceps tendon, right, initial encounter [S46.111A]    1 Day Post-Op    Subjective: Theodore Shoe ready to go home      Objective:     PT/OT:     PATIENT MOBILITY    Bed Mobility  Supine to Sit: (NT)  Sit to Supine: (NT)  Scooting: Stand-by assistance  Transfers  Sit to Stand: Stand-by assistance  Stand to Sit: Stand-by assistance  Bed to Chair: Stand-by assistance      Gait  Speed/Radha: Delayed  Step Length: Left shortened, Right shortened  Gait Abnormalities: Decreased step clearance  Ambulation - Level of Assistance: Stand-by assistance  Distance (ft): 100 Feet (ft)  Duration: 15 Minutes(extra time to work through activity noted)            Vital Signs:    Blood pressure 138/82, pulse 62, temperature 97.4 °F (36.3 °C), resp. rate 16, height 5' 9\" (1.753 m), weight 77.9 kg (171 lb 11.2 oz), SpO2 95 %.   Temp (24hrs), Av.3 °F (36.8 °C), Min:97.4 °F (36.3 °C), Max:98.9 °F (37.2 °C)      Pain Control:   Pain Assessment  Pain Scale 1: Numeric (0 - 10)  Pain Intensity 1: 8  Pain Location 1: Shoulder  Pain Orientation 1: Right  Pain Description 1: Aching  Pain Intervention(s) 1: Medication (see MAR)    Meds:  Current Facility-Administered Medications   Medication Dose Route Frequency    metoclopramide HCl (REGLAN) injection 5 mg  5 mg IntraVENous Q6H PRN    HYDROmorphone (DILAUDID) tablet 4 mg  4 mg Oral Q3H PRN    ibuprofen (MOTRIN) tablet 800 mg  800 mg Oral Q6H PRN    bisacodyl (DULCOLAX) suppository 10 mg  10 mg Rectal DAILY PRN    docusate sodium (COLACE) capsule 100 mg  100 mg Oral DAILY    ferrous sulfate tablet 325 mg  1 Tab Oral DAILY WITH BREAKFAST    magnesium citrate solution 296 mL  296 mL Oral DAILY PRN    sodium phosphate (FLEET'S) enema 1 Enema  1 Enema Rectal DAILY PRN    temazepam (RESTORIL) capsule 15 mg  15 mg Oral QHS PRN    sodium chloride (NS) flush 5-40 mL  5-40 mL IntraVENous Q8H    sodium chloride (NS) flush 5-40 mL  5-40 mL IntraVENous PRN    acetaminophen (TYLENOL) tablet 650 mg  650 mg Oral Q4H PRN    HYDROmorphone (PF) (DILAUDID) injection 1 mg  1 mg IntraVENous Q1H PRN    ondansetron (ZOFRAN) injection 4 mg  4 mg IntraVENous Q4H PRN        LAB:    Lab Results   Component Value Date/Time    INR 1.0 01/03/2020 12:37 PM     Lab Results   Component Value Date/Time    HGB 11.1 (L) 01/12/2020 03:54 AM    HGB 11.3 (L) 01/11/2020 03:36 AM    HGB 14.3 01/03/2020 12:37 PM       Wound Shoulder Right (Active)   Dressing Status Clean, dry, and intact 1/11/2020  8:00 PM   Dressing Type Aquacel 1/11/2020  8:00 PM   Splint Type/Material Sling 1/11/2020  8:00 PM   Drainage Amount None 1/10/2020  3:18 PM   Number of days: 2         Physical Exam:  No significant changes    Assessment:      Principal Problem:    Osteoarthritis of right glenohumeral joint (1/5/2020)    Active Problems:    Traumatic tear of right rotator cuff (1/5/2020)      Strain of muscle, fascia and tendon of long head of biceps, right arm, initial encounter (1/5/2020)      Superior glenoid labrum lesion of right shoulder (1/5/2020)      Arthritis of right shoulder region (1/10/2020)      Rupture long head biceps tendon, right, initial encounter (1/10/2020)         Plan:     Continue PT/OT/Rehab  D/c home today  Patient Expects to be Discharged to[de-identified] Private residence

## 2020-01-12 NOTE — PROGRESS NOTES
Problem: Mobility Impaired (Adult and Pediatric)  Goal: *Acute Goals and Plan of Care (Insert Text)  Description  GOALS (1-4 days):  (1.)  Patient will move from supine to sit and sit to supine  in bed with INDEPENDENT. (2.)  Patient will transfer from bed to chair and chair to bed with SUPERVISION using the least restrictive device. (3.)  Patient will ambulate with SUPERVISION for 200 feet with the least restrictive device. (4.)  Patient will be safe with shoulder HEP, with caregiver's help to increase range of motion per MD orders. ________________________________________________________________________________________________   Outcome: Progressing Towards Goal     PHYSICAL THERAPY: Daily Note and AM 1/12/2020  INPATIENT: Hospital Day: 3  Payor: [employer] / Plan: [address] [place] [address] / Product Type: Workers Comp /      NAME/AGE/GENDER: Kay Bahena is a 64 y.o. male   PRIMARY DIAGNOSIS: Rotator cuff arthropathy, right [M12.811]  Rotator cuff arthropathy, right [M12.811]  Arthritis of right shoulder region [M19.011]  Rupture long head biceps tendon, right, initial encounter [S46.111A] Osteoarthritis of right glenohumeral joint Osteoarthritis of right glenohumeral joint  Procedure(s) (LRB):  RIGHT SHOULDER REBLOCK (Right)  1 Day Post-Op  ICD-10: Treatment Diagnosis:   · Pain in Right Shoulder (M25.511)  · Stiffness of Right Shoulder, Not elsewhere classified (M25.611)  · Generalized Muscle Weakness (M62.81)  · Difficulty in walking, Not elsewhere classified (R26.2)   Precaution/Allergies:  Patient has no known allergies. ASSESSMENT:     Mr. Morales presents with painful & stiff right shoulder, pt was re-blocked this am, along with mildly unsteady gait & transfers. This pt will benefit from follow up therapy to establish HEP & to help restore safe functional mobility prior to returning home with caregiver. In pm session block appears to be wearing off.  Pt reviewed HEP with spouse present & written guidelines provided. Pt push through activity slowly. 1/12/20:  Patient feeling well today with improved pain. Moving well. Performed all exercises in standing with no balance issues. Good demonstration of HEP. Copy of HEP provided and left in room. This section established at most recent assessment   PROBLEM LIST (Impairments causing functional limitations):  1. Decreased Brush with Bed Mobility  2. Decreased Brush with Transfers  3. Decreased Brush with Ambulation   4. Decreased Brush with shoulder HEP   INTERVENTIONS PLANNED: (Benefits and precautions of physical therapy have been discussed with the patient.)  1. Bed Mobility Training  2. Transfer Training  3. Gait Training  4. Therapeutic Exercises per MD orders  5. Modalities for Pain     TREATMENT PLAN: Frequency/Duration: twice daily for duration of hospital stay  Rehabilitation Potential For Stated Goals: Good     RECOMMENDED REHABILITATION/EQUIPMENT: (at time of discharge pending progress): Continue Skilled Therapy and Home Health: Physical Therapy. HISTORY:   History of Present Injury/Illness (Reason for Referral):  Reverse right TSA  Past Medical History/Comorbidities:   Mr. Niesha Wong  has a past medical history of Drug addiction ([address] Eczema, Hiatal hernia, Kidney stones, Osteoarthritis of right glenohumeral joint (1/5/2020), and Rotator cuff tear. He also has no past medical history of Aneurysm ([address] Arrhythmia, Asthma, Autoimmune disease ([address] CAD (coronary artery disease), Cancer ([address] Chronic kidney disease, Chronic pain, Coagulation defects, COPD, Diabetes ([address] GERD (gastroesophageal reflux disease), Heart failure ([address] Hypertension, Liver disease, Psychiatric disorder, PUD (peptic ulcer disease), Seizures ([address] Stroke ([address] Thromboembolus ([address] or Thyroid disease.   Mr. Niesha Wong  has a past surgical history that includes hx knee arthroscopy (Right); upper arm/elbow surgery unlisted (Left); and hx orthopaedic (Left, ). Social History/Living Environment:   Home Environment: Private residence  # Steps to Enter: 4  Rails to Enter: Yes  Hand Rails : Bilateral  One/Two Story Residence: One story  Living Alone: No  Support Systems: Spouse/Significant Other/Partner  Patient Expects to be Discharged to[de-identified] Private residence  Prior Level of Function/Work/Activity:  Pt was independent without an assisted device prior to this admission   Number of Personal Factors/Comorbidities that affect the Plan of Care: 3+: HIGH COMPLEXITY   EXAMINATION:   Most Recent Physical Functioning:   Gross Assessment:  AROM: Within functional limits(B LEs and L UE)  Strength: Within functional limits(B LEs and L UE)  Coordination: Within functional limits            RUE Strength  R Elbow Flexion: 3  R : 4-       Balance:  Sitting: Intact  Standing: Intact Bed Mobility:          Transfers:  Sit to Stand: Independent  Stand to Sit: Independent  Gait:     Speed/Radha: Pace decreased (<100 feet/min)  Step Length: Left shortened;Right shortened  Ambulation - Level of Assistance: Independent   Functional Mobility:          Body Structures Involved:  1. Joints  2. Muscles Body Functions Affected:  1. Sensory/Pain  2. Movement Related Activities and Participation Affected:  1. General Tasks and Demands  2. Mobility   Number of elements that affect the Plan of Care: 4+: HIGH COMPLEXITY   CLINICAL PRESENTATION:   Presentation: Stable and uncomplicated: LOW COMPLEXITY   CLINICAL DECISION MAKIN \A Chronology of [place] [facility]\"" [address] [address] AM-PAC 6 Clicks   Basic Mobility Inpatient Short Form  How much difficulty does the patient currently have. .. Unable A Lot A Little None   1. Turning over in bed (including adjusting bedclothes, sheets and blankets)? [] 1   [] 2   [x] 3   [] 4   2.   Sitting down on and standing up from a chair with arms ( e.g., wheelchair, bedside commode, etc.)   [] 1   [] 2   [x] 3   [] 4 3. Moving from lying on back to sitting on the side of the bed? [] 1   [] 2   [x] 3   [] 4   How much help from another person does the patient currently need. .. Total A Lot A Little None   4. Moving to and from a bed to a chair (including a wheelchair)? [] 1   [] 2   [x] 3   [] 4   5. Need to walk in hospital room? [] 1   [] 2   [x] 3   [] 4   6. Climbing 3-5 steps with a railing? [x] 1   [] 2   [] 3   [] 4   © 2007, Trustees of [facility] [employer], under license to [employer] All rights reserved      Score:  Initial: 16 Most Recent: X (Date: -- )    Interpretation of Tool:  Represents activities that are increasingly more difficult (i.e. Bed mobility, Transfers, Gait). Medical Necessity:     · Patient is expected to demonstrate progress in   · strength, balance, coordination, and functional technique  ·  to   · decrease assistance required with bed mobility, transfers & gait   · .  Reason for Services/Other Comments:  · Patient continues to require skilled intervention due to   · Pt not independent with functional mobility. · .   Use of outcome tool(s) and clinical judgement create a POC that gives a: Difficult prediction of patient's progress: HIGH COMPLEXITY            TREATMENT:   (In addition to Assessment/Re-Assessment sessions the following treatments were rendered)   Pre-treatment Symptoms/Complaints:  Patient feeling well today and ready to go home. Pain: Initial:   Pain Intensity 1: 6  Post Session:  6/10     Therapeutic Exercise: (25 Minutes):  Exercises per grid below to improve mobility and dynamic movement of arm - right to improve functional endurance . Required minimal verbal cues to promote proper body alignment and promote proper body mechanics. Progressed repetitions as indicated.   Gait Training ( ):  Gait training to improve and/or restore physical functioning as related to mobility, strength, balance, coordination and dynamic movement of leg - bilateral to improve functional gait. Ambulated   with Independent using no device and minimal   related to their stride length and heel strike to promote proper body alignment, promote proper body posture and promote proper body mechanics. Date:  1/11 Date:  1/12/20 Date:     ACTIVITY/EXERCISE AM PM AM PM AM PM   Gripping 15p 15 20      Wrist Flexion/Extension 15p 15 20      Wrist Ulnar/Radial Deviation         Pronation/Supination 15p 15 20      Elbow Flexion/Extension 15p 15aa 20      Shoulder Flexion/Extension 15p 15aa in flex to tolerance       Shoulder AB/ADduction         Shoulder IR/ER         Pulleys         Pendulums 15p CW & CCW 15aa CW & CCW 20 CW, 20 CCW      Shrugs   20      Isometric:                 Flexion         Extension         ABduction         ADduction         Biceps/Triceps                  B = bilateral; AA = active assistive; A = active; P = passive  Education:  [x]  Home Exercises  [x]  Sling Application   [x]  Movement Precautions   []  Pulleys   [x]  Use of Ice   []  Other:   Treatment/Session Assessment:    · Response to Treatment:  Patient doing well this am.  Able to perform all exercises well. Copy of HEP left with patient. · Interdisciplinary Collaboration:   o Physical Therapist  o Registered Nurse  · After treatment position/precautions:   o Up in chair  o Caregiver at bedside  o Call light within reach  o RN notified   · Compliance with Program/Exercises: Will assess as treatment progresses. · Recommendations/Intent for next treatment session:  Treatment next visit will focus on increasing Mr. Hamilton's independence with bed mobility, transfers, gait training, strength/ROM exercises, modalities for pain, and patient education.    Total Treatment Duration:  PT Patient Time In/Time Out  Time In: 0800  Time Out: 0825    Bon Black PT

## 2020-01-12 NOTE — PROGRESS NOTES
[address]  Face to Face Encounter    Patients Name: Kay Bahena    YOB: 1960    Ordering Physician: Brittney Narayanan    Primary Diagnosis: RTSA    Date of Face to Face:  1/10/2020    Face to Face Encounter findings are related to primary reason for home care:   yes. 1. I certify that the patient needs intermittent care as follows: physical therapy: strengthening    2. I certify that this patient is homebound, that is: 1) patient requires the use of a walker device, special transportation, or assistance of another to leave the home; or 2) patient's condition makes leaving the home medically contraindicated; and 3) patient has a normal inability to leave the home and leaving the home requires considerable and taxing effort. Patient may leave the home for infrequent and short duration for medical reasons, and occasional absences for non-medical reasons. Homebound status is due to the following functional limitations: Patient with strength deficits limiting the performance of all ADL's without caregiver assistance or the use of an assistive device. 3. I certify that this patient is under my care and that I, or a nurse practitioner or  [id number], or clinical nurse specialist, or certified nurse midwife, working with me, had a Face-to-Face Encounter that meets the physician Face-to-Face Encounter requirements. The following are the clinical findings from the [address] [place] [address] encounter that support the need for skilled services and is a summary of the encounter: See hospital chart. See attached progess note    Judy Greco LMSW  1/12/2020      THE FOLLOWING TO BE COMPLETED BY THE COMMUNITY PHYSICIAN:    I concur with the findings described above from the F encounter that this patient is homebound and in need of a skilled service.     Certifying Physician: _____________________________________      Printed Certifying Physician Name: _____________________________________    Date: _________________

## 2020-01-13 LAB
ABO + RH BLD: NORMAL
BLD PROD TYP BPU: NORMAL
BLD PROD TYP BPU: NORMAL
BLOOD GROUP ANTIBODIES SERPL: NORMAL
BPU ID: NORMAL
BPU ID: NORMAL
CROSSMATCH RESULT,%XM: NORMAL
CROSSMATCH RESULT,%XM: NORMAL
SPECIMEN EXP DATE BLD: NORMAL
STATUS OF UNIT,%ST: NORMAL
STATUS OF UNIT,%ST: NORMAL
UNIT DIVISION, %UDIV: 0
UNIT DIVISION, %UDIV: 0

## 2020-01-14 ENCOUNTER — HOME CARE VISIT (OUTPATIENT)
Dept: SCHEDULING | Facility: HOME HEALTH | Age: 60
End: 2020-01-14
Payer: OTHER MISCELLANEOUS

## 2020-01-14 ENCOUNTER — HOME CARE VISIT (OUTPATIENT)
Dept: HOME HEALTH SERVICES | Facility: HOME HEALTH | Age: 60
End: 2020-01-14

## 2020-01-14 VITALS
DIASTOLIC BLOOD PRESSURE: 80 MMHG | SYSTOLIC BLOOD PRESSURE: 140 MMHG | HEART RATE: 96 BPM | RESPIRATION RATE: 18 BRPM | TEMPERATURE: 98.2 F

## 2020-01-14 PROCEDURE — G0151 HHCP-SERV OF PT,EA 15 MIN: HCPCS

## 2020-01-14 PROCEDURE — 400013 HH SOC

## 2020-01-14 NOTE — DISCHARGE SUMMARY
[address] SUMMARY    Name:  Nenita Phillips  MR#:  [id number]  :  1960  ACCOUNT #:  [de-identified]  ADMIT DATE:  01/10/2020  DISCHARGE DATE:  2020    ADMISSION DIAGNOSES:  1. Right shoulder sprain. 2.  Rotator cuff sprain, right shoulder. 3.  Bicipital strain, right shoulder. 4.  Superior labrum anterior posterior tear, right shoulder. 5.  Glenohumeral osteoarthritis, right shoulder. DISCHARGE DIAGNOSES:  1. Right shoulder sprain. 2.  Rotator cuff sprain, right shoulder. 3.  Bicipital strain, right shoulder. 4.  Superior labrum anterior posterior tear, right shoulder. 5.  Glenohumeral osteoarthritis, right shoulder. Please see H and P, operative summaries, and consult for details. HOSPITAL COURSE:  The patient is a 19-year-old gentleman who injured his right shoulder on the job. The patient was admitted on 01/10/2020 and underwent an uncomplicated reverse right total shoulder arthroplasty with a Delta Xtend prosthesis and biceps tenodesis. On postoperative day #1, hemoglobin and potassium were within normal limits. His magnesium was 1.4, it was repleted. He did receive a repeat interscalene block on postoperative day #1 for pain control. On postoperative day #2, all labs were within normal limits. His magnesium was up to 2.1. His pain was controlled. He was discharged home on postoperative day #2. He will continue therapy on the outside and follow up in my office in 10 days. This injury was secondary workplace injury.       MD CHRISTINA Lorenzo/[id number]  D:  2020 11:52  T:  2020 2:19  JOB #:  [id number]  CC:  Efra Hinson MD

## 2020-01-16 ENCOUNTER — HOME CARE VISIT (OUTPATIENT)
Dept: SCHEDULING | Facility: HOME HEALTH | Age: 60
End: 2020-01-16
Payer: OTHER MISCELLANEOUS

## 2020-01-16 ENCOUNTER — HOME CARE VISIT (OUTPATIENT)
Dept: HOME HEALTH SERVICES | Facility: HOME HEALTH | Age: 60
End: 2020-01-16
Payer: OTHER MISCELLANEOUS

## 2020-01-16 VITALS
TEMPERATURE: 97.7 F | DIASTOLIC BLOOD PRESSURE: 74 MMHG | SYSTOLIC BLOOD PRESSURE: 124 MMHG | HEART RATE: 76 BPM | RESPIRATION RATE: 19 BRPM

## 2020-01-16 VITALS
DIASTOLIC BLOOD PRESSURE: 80 MMHG | TEMPERATURE: 98.1 F | SYSTOLIC BLOOD PRESSURE: 140 MMHG | HEART RATE: 68 BPM | RESPIRATION RATE: 16 BRPM

## 2020-01-16 PROCEDURE — G0157 HHC PT ASSISTANT EA 15: HCPCS

## 2020-01-16 PROCEDURE — G0152 HHCP-SERV OF OT,EA 15 MIN: HCPCS

## 2020-01-17 ENCOUNTER — HOME CARE VISIT (OUTPATIENT)
Dept: SCHEDULING | Facility: HOME HEALTH | Age: 60
End: 2020-01-17
Payer: OTHER MISCELLANEOUS

## 2020-01-17 VITALS
HEART RATE: 64 BPM | TEMPERATURE: 98 F | DIASTOLIC BLOOD PRESSURE: 84 MMHG | RESPIRATION RATE: 21 BRPM | SYSTOLIC BLOOD PRESSURE: 130 MMHG

## 2020-01-17 PROCEDURE — G0157 HHC PT ASSISTANT EA 15: HCPCS

## 2020-01-21 ENCOUNTER — HOME CARE VISIT (OUTPATIENT)
Dept: HOME HEALTH SERVICES | Facility: HOME HEALTH | Age: 60
End: 2020-01-21
Payer: OTHER MISCELLANEOUS

## 2020-01-22 ENCOUNTER — HOME CARE VISIT (OUTPATIENT)
Dept: SCHEDULING | Facility: HOME HEALTH | Age: 60
End: 2020-01-22
Payer: OTHER MISCELLANEOUS

## 2020-01-22 PROCEDURE — G0151 HHCP-SERV OF PT,EA 15 MIN: HCPCS

## 2020-01-23 ENCOUNTER — HOME CARE VISIT (OUTPATIENT)
Dept: SCHEDULING | Facility: HOME HEALTH | Age: 60
End: 2020-01-23
Payer: OTHER MISCELLANEOUS

## 2020-01-23 VITALS
TEMPERATURE: 98 F | HEART RATE: 64 BPM | SYSTOLIC BLOOD PRESSURE: 124 MMHG | RESPIRATION RATE: 16 BRPM | DIASTOLIC BLOOD PRESSURE: 78 MMHG

## 2020-01-23 PROCEDURE — G0152 HHCP-SERV OF OT,EA 15 MIN: HCPCS

## 2020-02-17 ENCOUNTER — HOSPITAL ENCOUNTER (OUTPATIENT)
Dept: PHYSICAL THERAPY | Age: 60
Discharge: HOME | End: 2020-02-17
Payer: COMMERCIAL

## 2020-02-17 PROCEDURE — 97110 THERAPEUTIC EXERCISES: CPT

## 2020-02-17 PROCEDURE — 97161 PT EVAL LOW COMPLEX 20 MIN: CPT

## 2020-02-17 NOTE — PROGRESS NOTES
Radha Salvador  : 1960  Primary: [facility]  Secondary:  [address]  at [facility]  [address], [address], [place], [facility]  [phone]   [phone]        OUTPATIENT PHYSICAL THERAPY: Daily Treatment Note 2020  Visit Count:  1    ICD-10: Treatment Diagnosis:    Pain in right shoulder (M25.511)  Shoulder lesion, unspecified, right shoulder (M75.91)  Stiffness of right shoulder, not elsewhere classified (M25.611)     Surgery Date: 1/10/2020   Precautions/Allergies:   Patient has no known allergies.      TREATMENT PLAN:  Effective Dates: 2020 TO 2020 (90 days). Frequency/Duration: 2 times a week for 90 Day(s)         PAIN/SUBJECTIVE:   Initial:   6/10 in right shoulder and numbness/throbbing in right 4-5th digit  Post Session: 5/10 in right shoulder and numbness/throbbing in right 4-5th digit          Pre-treatment Symptoms/Complaints:  Symptoms consistent with post op right reverse total shoulder arthroplasty. Medications Last Reviewed:  2020  Updated Objective Findings:  See evaluation note from 2020. TREATMENT:     THERAPEUTIC EXERCISE: (25 minutes):  Exercises per grid below to improve mobility, strength, balance and coordination. Required minimal visual, verbal, manual and tactile cues to promote proper body mechanics. Progressed resistance, range, repetitions and complexity of movement as indicated.    Date:  2020 Date:   Date:     Activity/Exercise Parameters Parameters Parameters   UBE  6 minutes   Left hand guided   Level 1.0      Scapular Retractions    3x10      Wall Slides  10x w/ both hands   10x w/ one hand      FLEX and  ER w/ cane  2x10 w/in pain free range   W/ 1# cane      Scapular PNF  Therapist guided 10x w/ 5 sec hold     Thoracic Extension 20x   Seated with half foam roll     Thoracic Rotation  10x in L sidelying          [employer] Portal  Treatment/Session Summary:    · Response to Treatment: Patient is progressing well within protocol. Will continue to progress as indicated. .  · Communication/Consultation:  Patient's condition  · Equipment provided today:  None today  · Recommendations/Intent for next treatment session: Next visit will focus on right shoulder ROM, strength, and stability.     Total Treatment Billable Duration:  25 minutes  PT Patient Time In/Time Out  Time In: 0930  Time Out: [address], [address]    Future Appointments   Date Time Provider [place]   2/24/2020  9:30 AM Asiya Leigh PT [facility]   3/2/2020  9:30 AM Asiya Leigh, PT [facility]   3/9/2020  9:30 AM Asiya Leigh, PT [facility]   3/16/2020  9:30 AM Asiya Leigh, PT [facility]

## 2020-02-17 NOTE — THERAPY EVALUATION
Sergei De Leon  : 1960  Primary: [employer]  Secondary:  [address] at [address] [facility] [address], [address], [place], [address] [facility]  [phone]   [phone]         OUTPATIENT PHYSICAL THERAPY:Initial Assessment 2020   ICD-10: Treatment Diagnosis:  Pain in right shoulder (M25.511)  Shoulder lesion, unspecified, right shoulder (M75.91)  Stiffness of right shoulder, not elsewhere classified (M25.611)    Surgery Date: 1/10/2020   Precautions/Allergies:   Patient has no known allergies. TREATMENT PLAN:  Effective Dates: 2020 TO 2020 (90 days). Frequency/Duration: 2 times a week for 90 Day(s)    PAIN/SUBJECTIVE:   Initial:   6/10 in the right shoulder and numbness/throbbing in the 4-5th digit  Post Session:  5/10 in the right shoulder and numbness/throbbing in the 4-5th digit     MEDICAL/REFERRING DIAGNOSIS:  S/p right reverse total shoulder arthroplasty with biceps tenodesis     DATE OF ONSET: surgery 1/10/2020  REFERRING PHYSICIAN: Radha Wright MD MD Orders: Therapy Evaluation and Treatment   Return MD Appointment:   Future Appointments   Date Time Provider Ced Hope   2020  9:30 AM Kelley Cardenas, [facility]   3/2/2020  9:30 AM Kelley Cardenas, [facility]   3/9/2020  9:30 [facility]   3/16/2020  9:30 AM Kelley Cardenas, [facility]          INITIAL ASSESSMENT:  Mr. Morales presents with symptoms consistent with post op right reverse total shoulder arthroplasty with biceps tenodesis performed 1/10/2020 following an initial injury that occurred at work. Patient exhibits ROM, strength and stability deficits however is progressing well within protocol. Patient's primary complaints consists of numbness/throbbing in the 4-5th digit.  Patient describes difficulty reaching behind back and across shoulder, and was educated to avoid this positions at the point in time during his recovery to avoid secondary complications. Patient would benefit from skilled therapy treatment to improve function following protocol with the goal of returning to work. PROBLEM LIST:  1. Decreased Strength affecting function  2. Decreased ADL/Functional Activities  3. Decreased Flexibility/joint mobility  4. Increased Pain affecting function  5. Decreased Activity tolerance   6. Increased Fatigue affecting function   INTERVENTIONS PLANNED:  1. Balance Exercise  2. Gait Training  3. Home Exercise Program (HEP)  4. Neuromuscular Re-education/Strengthening  5. Therapeutic Activites  6. Therapeutic Exercise/Strengthening  7. Transfer Training  8. Unattended estim   9. Infrared      GOALS: (Goals have been discussed and agreed upon with patient.)  Time Frame: up to 45 days   Initial Goals   1. Decrease pain below 6/10 for the left shoulder with beginning home ADL's / shoulder mobility. 2. Decrease DASH from 48 to 30 in the right shoulder to indicate functional improvement and to demonstrate improved range of motion and functional improvement of the shoulder. 3. Patient to be independent with an initial HEP for the right shoulder for mobility and strengthening. DISCHARGE GOALS:     up to 90 days   1. Decrease pain below 3/10 for the left shoulder with home ADL's / shoulder mobility. 2. Decrease DASH to 20 in the right shoulder to indicate functional improvement and to demonstrate improved strength and mobility of the right shoulder    3. Patient to be independent with an advanced HEP for the right shoulder for mobility and strengthening. OUTCOME MEASURE:   Tool Used: Disabilities of the Arm, Shoulder and Hand (DASH) Questionnaire - Quick Version  Score:  Initial: 48/55  Most Recent: X/55 (Date: -- )   Interpretation of Score: The DASH is designed to measure the activities of daily living in person's with upper extremity dysfunction or pain.   Each section is scored on a 1-5 scale, 5 representing the greatest disability. The scores of each section are added together for a total score of 55. MEDICAL NECESSITY:   · Patient is expected to demonstrate progress in strength, range of motion, coordination and functional technique to increase independence with ADls. REASON FOR SERVICES/OTHER COMMENTS:  · Patient continues to require skilled intervention due to impaired mobility and strength. Total Duration: 25 minutes        Rehabilitation Potential For Stated Goals: Good  Regarding Daniela Hamilton's therapy, I certify that the treatment plan above will be carried out by a therapist or under their direction. Thank you for this referral,  Sandra Mcelroy     Referring Physician Signature: Collins Bender MD _______________________________ Date _____________     HISTORY:   History of Injury/Illness (Reason for Referral):  Per MD note, The patient is a 53-year-old gentleman who injured his right shoulder on the job. The patient was admitted on 01/10/2020 and underwent an uncomplicated reverse right total shoulder arthroplasty with a Delta Xtend prosthesis and biceps tenodesis. On postoperative day #1, hemoglobin and potassium were within normal limits. His magnesium was 1.4, it was repleted. He did receive a repeat interscalene block on postoperative day #1 for pain control. On postoperative day #2, all labs were within normal limits. His magnesium was up to 2.1. His pain was controlled. He was discharged home on postoperative day #2. He will continue therapy on the outside and follow up in my office in 10 days.     This injury was secondary workplace injury. Past Medical History/Comorbidities:   Mr. Brandee Landaverde  has a past medical history of Drug addiction ([address] Eczema, Hiatal hernia, Kidney stones, Osteoarthritis of right glenohumeral joint (1/5/2020), and Rotator cuff tear.  He also has no past medical history of Aneurysm ([address] Arrhythmia, Asthma, Autoimmune disease ([address] CAD (coronary artery disease), Cancer ([facility] [address] Chronic kidney disease, Chronic pain, Coagulation defects, COPD, Diabetes ([facility] [address] GERD (gastroesophageal reflux disease), Heart failure ([facility] [address] Hypertension, Liver disease, Psychiatric disorder, PUD (peptic ulcer disease), Seizures ([facility] [address] Stroke ([facility] [address] Thromboembolus ([facility] or Thyroid disease. Mr. Niesha Wong  has a past surgical history that includes hx knee arthroscopy (Right); upper arm/elbow surgery unlisted (Left); and hx orthopaedic (Left, 1990's). Social History/Living Environment:     Home Environment: Private residence  # Steps to Enter: 4  Rails to Enter: Yes  Hand Rails : Bilateral  One/Two Story Residence: One story  Living Alone: No  Support Systems: Spouse/Significant Other/Partner  Patient Expects to be Discharged to[de-identified] Private residence  Prior Level of Function/Work/Activity:  Independent with all mobility   Dominant Side:         LEFT  Personal Factors:          Sex:  male        Age:  64 y.o. Ambulatory/Rehab Services H2 Model Falls Risk Assessment   Risk Factors:       (1)  Gender [Male] Ability to Rise from Chair:       (0)  Ability to rise in a single movement   Falls Prevention Plan:       No modifications necessary   Total: (5 or greater = High Risk): 1   ©2010 [facility] [employer]. All Rights Reserved. [facility] Patent #[id number].  Federal Law prohibits the replication, distribution or use without written permission from [facility] [employer]   Current Medications:       Current Outpatient Medications:     HYDROmorphone (DILAUDID) 2 mg tablet, Take 4 mg by mouth every four to six (4-6) hours as needed for Pain., Disp: , Rfl:     promethazine (PHENERGAN) 25 mg tablet, Take 25 mg by mouth every eight (8) hours as needed for Nausea., Disp: , Rfl:     temazepam (RESTORIL) 15 mg capsule, Take 15 mg by mouth nightly as needed for Sleep., Disp: , Rfl:     ibuprofen (MOTRIN) 800 mg tablet, Take 800 mg by mouth every eight (8) hours as needed for Pain., Disp: , Rfl:     acetaminophen (TYLENOL PO), Take  by mouth as needed. , Disp: , Rfl:    Date Last Reviewed:  2/17/2020     Number of Personal Factors/Comorbidities that affect the Plan of Care: 1-2: MODERATE COMPLEXITY   EXAMINATION:     Right                               AROM   STRENGTH   PROM  NORMALS  Flexion                   150*          3                 160*              160  ABDUCTION          140 *         3               150 *             160  Extension                60            3                 60                 60  ER                             T2           3                 80                 85   IR                              GT           3                NT                  T12    *indicates antalgic mobility     Strength:See Above    Special Tests: All negative   SICK scapula present on R>L. Neurological Screen:    Myotomes: intact. Dermatomes: intact bilateral UE's light touch and proprioception with exception of C7 and T1. Patient reports diminished sensation on right extremity. Reflexes: normal  Functional Mobility: intact  Balance: intact    Body Structures Involved:  1. Nerves  2. Bones  3. Joints  4. Muscles  5. Ligaments Body Functions Affected:  1. Mental  2. Sensory/Pain  3. Neuromusculoskeletal  4. Movement Related  5. Skin Related Activities and Participation Affected:  1. Learning and Applying Knowledge  2. General Tasks and Demands  3. Communication  4. Mobility  5.  Interpersonal Interactions and Relationships      Number of elements (examined above) that affect the Plan of Care: 1-2: LOW COMPLEXITY   CLINICAL PRESENTATION:   Presentation: Stable and uncomplicated: LOW COMPLEXITY   CLINICAL DECISION MAKING:   Use of outcome tool(s) and clinical judgement create a POC that gives a: Clear prediction of patient's progress: LOW COMPLEXITY

## 2020-02-24 ENCOUNTER — HOSPITAL ENCOUNTER (OUTPATIENT)
Dept: PHYSICAL THERAPY | Age: 60
Discharge: HOME | End: 2020-02-24
Payer: COMMERCIAL

## 2020-02-24 PROCEDURE — 97014 ELECTRIC STIMULATION THERAPY: CPT

## 2020-02-24 PROCEDURE — 97110 THERAPEUTIC EXERCISES: CPT

## 2020-02-24 PROCEDURE — 97140 MANUAL THERAPY 1/> REGIONS: CPT

## 2020-02-24 NOTE — PROGRESS NOTES
Sergei De Leon  : 1960  Primary: [employer]  Secondary:  [address] at 614 [facility] [address], [address] [facility], [place], [facility] [address] [facility]  [phone]   [phone]        OUTPATIENT PHYSICAL THERAPY: Daily Treatment Note 2020  Visit Count:  2    ICD-10: Treatment Diagnosis:    Pain in right shoulder (M25.511)  Shoulder lesion, unspecified, right shoulder (M75.91)  Stiffness of right shoulder, not elsewhere classified (M25.611)     Surgery Date: 1/10/2020     Precautions/Allergies:   Patient has no known allergies.      TREATMENT PLAN:  Effective Dates: 2020 TO 2020 (90 days). Frequency/Duration: 2 times a week for 90 Day(s)         PAIN/SUBJECTIVE:   Initial:   3/10 in right shoulder and numbness/throbbing in right 4-5th digit  Post Session: 1-2/10 in right shoulder and numbness/throbbing in right 4-5th digit          Pre-treatment Symptoms/Complaints:  Patient states that he was sore after last session for about 2 days but has overall decreased pain levels today. Medications Last Reviewed:  2020  Updated Objective Findings:  See evaluation note from 2020. TREATMENT:     THERAPEUTIC EXERCISE: (30 minutes):  Exercises per grid below to improve mobility, strength, balance and coordination. Required minimal visual, verbal, manual and tactile cues to promote proper body mechanics. Progressed resistance, range, repetitions and complexity of movement as indicated. MANUAL THERAPY: (10 minutes): Joint mobilization to glenohumeral inferior grade 2-3 and Soft tissue mobilization was utilized and necessary because of the patient's restricted joint motion, painful spasm, loss of articular motion and restricted motion of soft tissue. MODALITIES: (10 minutes):       *  Electrical Stimulation Therapy ( hertz quadrapolar to the right shoulder and deltoid region while patient in sitting with right arm supported) was provided with intensity adjusted throughout treatment to patient tolerance. w/ concurrent hot pack. Date:  2/17/2020 Date:  2/24/2020 Date:     Activity/Exercise Parameters Parameters Parameters   UBE  6 minutes   Left hand guided   Level 1.0  8 minutes   Level 3.0   4 min for/4 min back    Scapular Retractions    3x10  3x10     Wall Slides  10x w/ both hands   10x w/ one hand  10x w/ both hands   10x w/ one hand     FLEX and  ER w/ cane  2x10 w/in pain free range   W/ 1# cane      Scapular PNF  Therapist guided 10x w/ 5 sec hold Therapist guided 10x w/ 5 sec hold  5x therapist resisted     Thoracic Extension 20x   Seated with half foam roll [id number]     Thoracic Rotation  10x in L sidelying  10x    SA Supine Punches                       [facility] Portal  Treatment/Session Summary:    · Response to Treatment:  Patient continues to show good ROM with progression of protocol. Patient states and complains of limitations secondary to numbness/tingling into the 4-5th digit affecting  strength. Patient exhibited decreased muscle endurance especially in the rotator cuff musculature with difficulty maintaining form throughout reps and sets. Will continue to progress as tolerated. · Communication/Consultation:  Patient's condition  · Equipment provided today:  None today  · Recommendations/Intent for next treatment session: Next visit will focus on right shoulder ROM, strength, and stability.     Total Treatment Billable Duration:  40 minutes + 10 minutes untimed   PT Patient Time In/Time Out  Time In: 0930  Time Out: [address] [place] [address]    Future Appointments   Date Time Provider Ced Hope   2/27/2020  2:30 PM Gretchen Hay PT [facility]   3/2/2020  9:30 AM Gretchen Hay PT [facility]   3/5/2020 11:00 AM Gretchen Hay PT [facility]

## 2020-02-27 ENCOUNTER — HOSPITAL ENCOUNTER (OUTPATIENT)
Dept: PHYSICAL THERAPY | Age: 60
Discharge: HOME | End: 2020-02-27
Payer: COMMERCIAL

## 2020-02-27 PROCEDURE — 97110 THERAPEUTIC EXERCISES: CPT

## 2020-02-27 PROCEDURE — 97014 ELECTRIC STIMULATION THERAPY: CPT

## 2020-02-27 PROCEDURE — 97140 MANUAL THERAPY 1/> REGIONS: CPT

## 2020-02-27 NOTE — PROGRESS NOTES
Chantell Bruno  : 1960  Primary: [employer]  Secondary:  [address]  at [facility]  [address], [address] [facility], [address]  [phone]   [phone]        OUTPATIENT PHYSICAL THERAPY: Daily Treatment Note 2020  Visit Count:  3    ICD-10: Treatment Diagnosis:    Pain in right shoulder (M25.511)  Shoulder lesion, unspecified, right shoulder (M75.91)  Stiffness of right shoulder, not elsewhere classified (M25.611)     Surgery Date: 1/10/2020     Precautions/Allergies:   Patient has no known allergies.      TREATMENT PLAN:  Effective Dates: 2020 TO 2020 (90 days). Frequency/Duration: 2 times a week for 90 Day(s)         PAIN/SUBJECTIVE:   Initial:   2-3/10 in right shoulder and numbness/throbbing in right 4-5th digit  Post Session: 2/10 in right shoulder and numbness/throbbing in right 4-5th digit          Pre-treatment Symptoms/Complaints:  Patient stated he needed to leave todays session early to  daughter. Patient reported improvement in hand function and decreased severity of tingling however no change in frequency. Medications Last Reviewed:  2020  Updated Objective Findings:  See evaluation note from 2020. TREATMENT:     THERAPEUTIC EXERCISE: (15 minutes):  Exercises per grid below to improve mobility, strength, balance and coordination. Required minimal visual, verbal, manual and tactile cues to promote proper body mechanics. Progressed resistance, range, repetitions and complexity of movement as indicated. MANUAL THERAPY: (10 minutes): Joint mobilization to glenohumeral inferior grade 2-3 and Soft tissue mobilization over the latissimus dorsi was utilized and necessary because of the patient's restricted joint motion, painful spasm, loss of articular motion and restricted motion of soft tissue. D1 flexion/D2 extension PNF PROM patterns. Concurrent with e-stim    MODALITIES: (10 minutes):       *  Electrical Stimulation Therapy ( hertz quadrapolar to the right shoulder and deltoid region while patient in sitting with right arm supported) was provided with intensity adjusted throughout treatment to patient tolerance. w/ concurrent hot pack. Date:  2/17/2020 Date:  2/24/2020 Date:     Activity/Exercise Parameters Parameters Parameters   UBE  6 minutes   Left hand guided   Level 1.0  8 minutes   Level 3.0   4 min for/4 min back 6 minutes   Level 4.0   3 min for/3 min back   Scapular Retractions    3x10  3x10     Wall Slides  10x w/ both hands   10x w/ one hand  10x w/ both hands   10x w/ one hand  Wall crawl w/ right arm   10x    FLEX and  ER w/ cane  2x10 w/in pain free range   W/ 1# cane      Scapular PNF  Therapist guided 10x w/ 5 sec hold Therapist guided 10x w/ 5 sec hold  5x therapist resisted     Thoracic Extension 20x   Seated with half foam roll [id number]     Thoracic Rotation  10x in L sidelying  10x    SA Supine Punches       Shoulder Perturbations   3 mins   Elbow flexed at 90 degrees supported on towel and arm abducted to 45 degrees. Shoulder PNF    3 mins D1 flexion/D2 extension        [facility] Portal  Treatment/Session Summary:    · Response to Treatment: Patient continues to demonstrate progress towards therapy goals within protocol. Patient has decreased ability to maintain shoulder stabilization with light perturbations. Will continue to progress as tolerated and per Dr. Gwendolyn Joshi protocol. · Communication/Consultation:  Patient's condition  · Equipment provided today:  None today  · Recommendations/Intent for next treatment session: Next visit will focus on right shoulder ROM, strength, and stability.     Total Treatment Billable Duration:  25 minutes + 10 minutes untimed   PT Patient Time In/Time Out  Time In: 1430  Time Out: [address] [place]    Future Appointments   Date Time Provider Ced Hope   3/2/2020  9:30 AM Edin Tinsley PT [facility]   3/5/2020 11:00 AM Guillermina Benjamin, PT [facility]

## 2020-03-02 ENCOUNTER — HOSPITAL ENCOUNTER (OUTPATIENT)
Dept: PHYSICAL THERAPY | Age: 60
Discharge: HOME | End: 2020-03-02
Payer: COMMERCIAL

## 2020-03-02 PROCEDURE — 97140 MANUAL THERAPY 1/> REGIONS: CPT

## 2020-03-02 PROCEDURE — 97110 THERAPEUTIC EXERCISES: CPT

## 2020-03-02 NOTE — PROGRESS NOTES
Yousif Singletary  : 1960  Primary: [employer]  Secondary:  [address] at [address] [facility] [address], [address] [facility], [place], [facility] [address] [facility]  [phone]   [phone]        OUTPATIENT PHYSICAL THERAPY: Daily Treatment Note 3/2/2020  Visit Count:  4    ICD-10: Treatment Diagnosis:    Pain in right shoulder (M25.511)  Shoulder lesion, unspecified, right shoulder (M75.91)  Stiffness of right shoulder, not elsewhere classified (M25.611)     Surgery Date: 1/10/2020     Precautions/Allergies:   Patient has no known allergies.      TREATMENT PLAN:  Effective Dates: 2020 TO 2020 (90 days). Frequency/Duration: 2 times a week for 90 Day(s)     PAIN/SUBJECTIVE:   Initial:   2/10 in right shoulder and numbness/throbbing in right 4-5th digit  Post Session: 1-2/10 in right shoulder and numbness/throbbing in right 4-5th digit          Pre-treatment Symptoms/Complaints:  Patient states that the shoulder is feeling better but still has tingling down the arm into only the pinky finger now versus the ring and pinky finger. Medications Last Reviewed:  3/2/2020  Updated Objective Findings:  See evaluation note from 2020. TREATMENT:     THERAPEUTIC EXERCISE: (45 minutes):  Exercises per grid below to improve mobility, strength, balance and coordination. Required minimal visual, verbal, manual and tactile cues to promote proper body mechanics. Progressed resistance, range, repetitions and complexity of movement as indicated. MANUAL THERAPY: (10 minutes): Joint mobilization to glenohumeral inferior grade 2-3 and Soft tissue mobilization over the latissimus dorsi was utilized and necessary because of the patient's restricted joint motion, painful spasm, loss of articular motion and restricted motion of soft tissue. STM to the uppertrap and deltoid on right arm. MODALITIES: ( minutes):       *  Electrical Stimulation Therapy ( hertz quadrapolar to the right shoulder and deltoid region while patient in sitting with right arm supported) was provided with intensity adjusted throughout treatment to patient tolerance. w/ concurrent hot pack. Date:  2/24/2020 Date:  2/27/2020 Date  03/02/2020   Activity/Exercise Parameters Parameters Parameters    UBE  8 minutes   Level 3.0   4 min for/4 min back 6 minutes   Level 4.0   3 min for/3 min back 8 minutes   Level 3.0   4 min forward/4 min backward    Scapular Retractions    3x10   3x10   Wall Slides  10x w/ both hands   10x w/ one hand  Wall crawl w/ right arm   10x  Wall crawl w/ right arm   10x    FLEX and  ER w/ cane       Scapular PNF  Therapist guided 10x w/ 5 sec hold  5x therapist resisted      Thoracic Extension [id number]   [id number]    Thoracic Rotation  10x     SA Supine Punches       Shoulder Perturbations  3 mins   Elbow flexed at 90 degrees supported on towel and arm abducted to 45 degrees. 2x45 sec   Elbow flexed at 90 degrees supported on towel and arm abducted to 45 degrees. Shoulder PNF   3 mins D1 flexion/D2 extension  3 mins D1 flexion/D2 extension   AAROM --> AROM    Shoulder Isometrics    On wall: flexion, extension, abduction   W/ yellow theraband: ER and IR        [employer] Portal  Treatment/Session Summary:    · Response to Treatment:  Patient states feelings of improvement in motion. Requires cueing to not over-do exercises especially with isometrics. Educated to perform all exercises in pain-free manner. Plan of care initiating strengthening within Dr. Brandon Carlin protocol. Will continue to progress as tolerated and per protocol. · Communication/Consultation:  Patient's condition  · Equipment provided today:  None today  · Recommendations/Intent for next treatment session: Next visit will focus on right shoulder ROM, strength, and stability.     Total Treatment Billable Duration:  55 minutes    PT Patient Time In/Time Out  Time In: 0930  Time Out: [address], [place]    Future Appointments Date Time Provider Ced Hope   3/5/2020 11:00 AM Jhony Marcelo, PT [facility]

## 2020-03-03 NOTE — PROGRESS NOTES
Please send a recent progress  Summary with objective measurements. Please fax or email: [phone]  or [email or web address]     Patient Progress Note 3/3/2020 (needed every 2 weeks)    Provider Name:  [address] [place] [address] (Eduard Leung @ [facility]) Patient's Name:  Elizabeth Ramos   Today's Date:  3/3/2020 Claim #: [id number]   :  1960 Surgery Date: 1/10/2020   Eval Date: 2020 Service: Physical Therapy   Diagnosis: S/p right reverse total shoulder arthroplasty with biceps tenodesis        AROM/PROM 2020 3/5/2020   STRENGTH 2020 3/5/2020    Joint:   Eval Date:   Re-Assess:   Re-Assess:    Joint: Eval Date:   Re-Assess:   Re-Assess:     Shoulder Flexion  150 degrees 155 degrees   Shoulder Flexion  3 3+    Shoulder Abduction  140 degrees 145 degres   Shoulder Abduction  3 3+    Shoulder ER  50 degrees 65 degrees   Shoulder ER  3 3+    Shoulder IR  40 degrees 60 degrees    Shoulder IR  3 3+    PAIN LEVEL 8/10 for the right hand,   Shoulder   5/10 8/10 for the right hand ,  Shoulder 3/10    STRENGTH  Right  Eleazar position II : 40 pounds   LEFT  :  120 pounds  Right  Eleazar position II:   40 pounds   LEFT  :   120 pounds           Progress toward work related goals:  Ongoing    Remaining Deficits:  Right  weakness, has been referred to hand specialist per patient report     New Goals:  Continuation of protocol and goals. Summary of treatment provided:  Strengthening, ROM, Modalities for pain modulation and control.          Co-morbidities slowing progress: []Diabetes/Metabolic Disease   []Infection   []Scarring   []Stroke    []Auto-Immune Diesease   []Morbid Obesity   []Heart Disease   []Pregnancy   []Osteoporosis    Recommendations: (use .alignrecommendations to refresh this section)  [x] Continue Current POC with current authorization; 0 Visits remaining  [x] Recommend Continued therapy  3  x wk for  6  wks** (NEW RX REQUIRED BY MD)  [] Discharge  0  % Goals Met  [] D/C - poor compliance  [] D/C - plateau - f/u MD  Plan for D/C < 2 wks [] 2-4 wks [] 4-6 wks [] >6 wks [x] D/C expected w/in requested auth? NO     Therapist Name:  Mak Mcwilliams PT   Signature: ______________________________  Date:  3/3/2020   **If Provider recommending additional therapy, provider must obtain MD RX and submit to [address].   [id number]:        Page 1 of 1

## 2020-03-05 ENCOUNTER — HOSPITAL ENCOUNTER (OUTPATIENT)
Dept: PHYSICAL THERAPY | Age: 60
Discharge: HOME | End: 2020-03-05
Payer: COMMERCIAL

## 2020-03-05 PROCEDURE — 97110 THERAPEUTIC EXERCISES: CPT

## 2020-03-05 NOTE — PROGRESS NOTES
José Miguel Ma  : 1960  Primary: [facility]  Secondary:  [id number]  at [facility]  [address], [address], [place] [address] [address] [facility]  [phone]   [phone]        OUTPATIENT PHYSICAL THERAPY: Daily Treatment Note 3/5/2020  Visit Count:  5    ICD-10: Treatment Diagnosis:    Pain in right shoulder (M25.511)  Shoulder lesion, unspecified, right shoulder (M75.91)  Stiffness of right shoulder, not elsewhere classified (M25.611)     Surgery Date: 1/10/2020     Precautions/Allergies:   Patient has no known allergies.      TREATMENT PLAN:  Effective Dates: 2020 TO 2020 (90 days). Frequency/Duration: 2 times a week for 90 Day(s)     PAIN/SUBJECTIVE:   Initial:   4/10 in right shoulder and numbness/throbbing in right 4-5th digit  Post Session: 3/10 in right shoulder and numbness/throbbing in right 4-5th digit          Pre-treatment Symptoms/Complaints:  Patient is going to see a specialist on  for the numbness/tingling is his hand. Patient reports increased pain since Tuesday night but does not report any increase in activity to attribute to the pain. Medications Last Reviewed:  3/5/2020  Updated Objective Findings:  See evaluation note from 2020. TREATMENT:     THERAPEUTIC EXERCISE: (40 minutes):  Exercises per grid below to improve mobility, strength, balance and coordination. Required minimal visual, verbal, manual and tactile cues to promote proper body mechanics. Progressed resistance, range, repetitions and complexity of movement as indicated. MANUAL THERAPY: ( minutes): Joint mobilization to glenohumeral inferior grade 2-3 and Soft tissue mobilization over the latissimus dorsi was utilized and necessary because of the patient's restricted joint motion, painful spasm, loss of articular motion and restricted motion of soft tissue. STM to the uppertrap and deltoid on right arm. MODALITIES: ( minutes):       *  Electrical Stimulation Therapy ( hertz quadrapolar to the right shoulder and deltoid region while patient in sitting with right arm supported) was provided with intensity adjusted throughout treatment to patient tolerance. w/ concurrent hot pack. Date:  2/27/2020 Date  03/02/2020 Date  3/5/2020   Activity/Exercise Parameters Parameters  Parameters    UBE  6 minutes   Level 4.0   3 min for/3 min back 8 minutes   Level 3.0   4 min forward/4 min backward  8 minutes   Level 3.0   4 min forward/4 min backward    Scapular Retractions     3x10 3x10    Wall Slides  Wall crawl w/ right arm   10x  Wall crawl w/ right arm   10x  Wall crawl w/ right arm   10x    FLEX and  ER w/ cane    20x w/ 2# bar in each direction   Concurrent with moist hot pack    Scapular PNF       Thoracic Extension  [id number]     Thoracic Rotation       SA Supine Punches       Shoulder Perturbations 3 mins   Elbow flexed at 90 degrees supported on towel and arm abducted to 45 degrees. 2x45 sec   Elbow flexed at 90 degrees supported on towel and arm abducted to 45 degrees. 2x30 sec   Elbow flexed at 90 degrees supported on towel and arm abducted to 45 degrees. Shoulder PNF  3 mins D1 flexion/D2 extension  3 mins D1 flexion/D2 extension   AAROM --> AROM  Standing D1 flexion   ([place])    Shoulder Isometrics   On wall: flexion, extension, abduction   W/ yellow theraband: ER and IR  On wall: flexion, extension, abduction   W/ yellow theraband: ER and IR   10x for 5 sec holds        TORIA Portal  Treatment/Session Summary:    · Response to Treatment:  Patient reported increased pain in the shoulder during todays session however able to complete all desired tasks with no further increase in pain. Patient stated that pain improved with exercise. Patient exhibits noticeable strength loss throughout the right upper extremity. Will continue to progress as tolerated.    · Communication/Consultation:  Patient's condition  · Equipment provided today: None today  · Recommendations/Intent for next treatment session: Next visit will focus on right shoulder ROM, strength, and stability. Total Treatment Billable Duration:  40 minutes    PT Patient Time In/Time Out  Time In: 1100  Time Out: [facility], PT    No future appointments.

## 2020-03-09 ENCOUNTER — APPOINTMENT (OUTPATIENT)
Dept: PHYSICAL THERAPY | Age: 60
End: 2020-03-09
Payer: COMMERCIAL

## 2020-03-16 ENCOUNTER — APPOINTMENT (OUTPATIENT)
Dept: PHYSICAL THERAPY | Age: 60
End: 2020-03-16
Payer: COMMERCIAL

## 2020-04-27 PROBLEM — G56.21 CUBITAL TUNNEL SYNDROME ON RIGHT: Status: ACTIVE | Noted: 2020-04-27

## 2020-04-27 NOTE — BRIEF OP NOTE
BRIEF OPERATIVE NOTE    Date of Procedure: 5/7/2020     Preoperative Diagnosis:  CUBITAL TUNNEL SYNDROME RIGHT ELBOW    Postoperative Diagnosis:  SAME    Procedure(s): ULNAR NERVE DECOMPRESSION, ANTERIOR TRANSPOSITION RIGHT ELBOW    Surgeon(s) and Role:     * Romana Forbes MD - Primary         Assistant Staff:  Lupe Cantor NP      Surgical Staff:  Circ-1: (Unknown)  Scrub Tech-1: (Unknown)  No case tracking events are documented in the log. Anesthesia:  GENERAL WITH SUPRACLAVICULAR NERVE BLOCK    Estimated Blood Loss: 5 cc.     Complications: NONE        TOURNIQUET:  24 MINUTES    Mata Hogue MD

## 2020-04-27 NOTE — H&P
[facility] HISTORY AND PHYSICAL    Subjective:     Patient is a 64 y.o. LHD MALE WITH RIGHT ELBOW PAIN AND UPPER EXTREMITY NUMBNESS, TINGLING. SEE OFFICE NOTE. Patient Active Problem List    Diagnosis Date Noted    Cubital tunnel syndrome on right 04/27/2020    Arthritis of right shoulder region 01/10/2020    Rupture long head biceps tendon, right, initial encounter 01/10/2020    Traumatic tear of right rotator cuff 01/05/2020    Strain of muscle, fascia and tendon of long head of biceps, right arm, initial encounter 01/05/2020    Superior glenoid labrum lesion of right shoulder 01/05/2020    Osteoarthritis of right glenohumeral joint 01/05/2020     Past Medical History:   Diagnosis Date    Drug addiction [facility])     history of; drug free for 13 years.  Eczema     Hiatal hernia     Kidney stones     Osteoarthritis of right glenohumeral joint 1/5/2020    Rotator cuff tear     left      Past Surgical History:   Procedure Laterality Date    HX KNEE ARTHROSCOPY Right     HX ORTHOPAEDIC Left 1990's    tendon release - due to injury    UPPER ARM/ELBOW SURGERY UNLISTED Left     fx repair      Prior to Admission medications    Medication Sig Start Date End Date Taking? Authorizing Provider   HYDROmorphone (DILAUDID) 2 mg tablet Take 4 mg by mouth every four to six (4-6) hours as needed for Pain. Provider, Historical   promethazine (PHENERGAN) 25 mg tablet Take 25 mg by mouth every eight (8) hours as needed for Nausea. Provider, Historical   temazepam (RESTORIL) 15 mg capsule Take 15 mg by mouth nightly as needed for Sleep. Provider, Historical   ibuprofen (MOTRIN) 800 mg tablet Take 800 mg by mouth every eight (8) hours as needed for Pain. Provider, Historical   acetaminophen (TYLENOL PO) Take  by mouth as needed.     Provider, Historical     No Known Allergies   Social History     Tobacco Use    Smoking status: Former Smoker     Last attempt to quit: 3/1/2009     Years since quittin.1    Smokeless tobacco: Never Used   Substance Use Topics    Alcohol use: No      No family history on file. Review of Systems  A comprehensive review of systems was negative except for that written in the HPI. Objective:     No data found. There were no vitals taken for this visit. General:  Alert, cooperative, no distress, appears stated age. Head:  Normocephalic, without obvious abnormality, atraumatic. Back:   Symmetric, no curvature. ROM normal. No CVA tenderness. Lungs:   Clear to auscultation bilaterally. Chest wall:  No tenderness or deformity. Heart:  Regular rate and rhythm, S1, S2 normal, no murmur, click, rub or gallop. Extremities: Extremities normal, atraumatic, no cyanosis or edema. Pulses: 2+ and symmetric all extremities. Skin: Skin color, texture, turgor normal. No rashes or lesions   Lymph nodes: Cervical, supraclavicular, and axillary nodes normal.   Neurologic: CNII-XII intact. Normal strength, sensation and reflexes throughout. Assessment:     Principal Problem:    Cubital tunnel syndrome on right (2020)        Plan:     The various methods of treatment have been discussed with the patient and family. PATIENT HAS EXHAUSTED NON-OPERATIVE MODALITIES     After consideration of risks, benefits and other options for treatment, the patient has consented to surgical intervention.     SEE OFFICE NOTE    Henri Pepper MD

## 2020-04-28 NOTE — PERIOP NOTES
The patient and family have been mailed the information provided by [facility] in regards to resuming surgery during COVID-19. The patient and family have been informed of [facility] procedures to minimize COVID-19 related risk, but that elimination of risk is not possible. The patient and family have been informed of the visitation policy during their surgery - that one visitor is to wait outside during the surgery and Dr. Anibal Runner will provide them with an update and postoperative instructions. The patient has been advised to maintain the stay at home order for two weeks prior and two weeks after surgery. The patient and family have been educated to monitor symptoms such as fever, cough (dry or productive), shortness of breath, difficulty breathing, sore throat, laryngitis, headache, fatigue, unexplained soreness, diarrhea, nausea and sudden loss of taste or smell. They have been advised if the patient develops any of these symptoms, they are to contact our office to review and possibly reschedule their surgery. This was discussed via phone with the patient on 4/24/20.

## 2020-04-30 ENCOUNTER — HOSPITAL ENCOUNTER (OUTPATIENT)
Dept: SURGERY | Age: 60
Discharge: HOME | End: 2020-04-30

## 2020-05-01 VITALS — BODY MASS INDEX: 26.36 KG/M2 | WEIGHT: 178 LBS | HEIGHT: 69 IN

## 2020-05-01 RX ORDER — TRAMADOL HYDROCHLORIDE 50 MG/1
50 TABLET ORAL
COMMUNITY

## 2020-05-01 NOTE — PERIOP NOTES
Patient verified name and . Order for consent found in EHR and matches case posting; patient verifies procedure. Type 1B surgery, PAT PHONE assessment complete. Orders received. Labs per surgeon: Hemoglobin A1c DOS and POC glucose DOS; orders signed and held in EHR. Labs per anesthesia protocol: None. Patient answered medical/surgical history questions at their best of ability. All prior to admission medications documented in [facility]. Patient instructed to take the following medications the day of surgery according to anesthesia guidelines with a small sip of water: NONE. Hold all vitamins, supplements, herbals 7 days prior to surgery and NSAIDS 5 days prior to surgery. Patient instructed on the following:  No visitors at this time; patients will be dropped off at entrance. Arrive at [employer], time of arrival to be called the day before by 1700  NPO after midnight including gum, mints, and ice chips  Responsible adult must drive patient to the hospital, stay during surgery, and patient will need supervision 24 hours after anesthesia  Use anti-bacterial soap in shower the night before surgery and on the morning of surgery  All piercings must be removed prior to arrival.    Leave all valuables (money and jewelry) at home but bring insurance card and ID on       DOS. Do not wear make-up, nail polish, lotions, cologne, perfumes, powders, or oil on skin. Patient teach back successful and patient demonstrates knowledge of instruction.

## 2020-05-04 LAB — SARS-COV-2, NAA: NORMAL

## 2020-05-06 ENCOUNTER — ANESTHESIA EVENT (OUTPATIENT)
Dept: SURGERY | Age: 60
End: 2020-05-06
Payer: OTHER MISCELLANEOUS

## 2020-05-07 ENCOUNTER — HOSPITAL ENCOUNTER (OUTPATIENT)
Age: 60
Setting detail: OUTPATIENT SURGERY
Discharge: HOME | End: 2020-05-07
Attending: ORTHOPAEDIC SURGERY | Admitting: ORTHOPAEDIC SURGERY
Payer: OTHER MISCELLANEOUS

## 2020-05-07 ENCOUNTER — APPOINTMENT (OUTPATIENT)
Dept: GENERAL RADIOLOGY | Age: 60
End: 2020-05-07
Attending: ORTHOPAEDIC SURGERY
Payer: OTHER MISCELLANEOUS

## 2020-05-07 ENCOUNTER — ANESTHESIA (OUTPATIENT)
Dept: SURGERY | Age: 60
End: 2020-05-07
Payer: OTHER MISCELLANEOUS

## 2020-05-07 VITALS
HEIGHT: 69 IN | TEMPERATURE: 97.7 F | SYSTOLIC BLOOD PRESSURE: 163 MMHG | WEIGHT: 176 LBS | OXYGEN SATURATION: 96 % | HEART RATE: 72 BPM | BODY MASS INDEX: 26.07 KG/M2 | RESPIRATION RATE: 18 BRPM | DIASTOLIC BLOOD PRESSURE: 96 MMHG

## 2020-05-07 LAB
EST. AVERAGE GLUCOSE BLD GHB EST-MCNC: 120 MG/DL
GLUCOSE BLD STRIP.AUTO-MCNC: 94 MG/DL (ref 65–100)
HBA1C MFR BLD: 5.8 %

## 2020-05-07 PROCEDURE — 82962 GLUCOSE BLOOD TEST: CPT

## 2020-05-07 PROCEDURE — 76210000020 HC REC RM PH II FIRST 0.5 HR: Performed by: ORTHOPAEDIC SURGERY

## 2020-05-07 PROCEDURE — 77030031139 HC SUT VCRL2 J&J -A: Performed by: ORTHOPAEDIC SURGERY

## 2020-05-07 PROCEDURE — 74011250637 HC RX REV CODE- 250/637: Performed by: ANESTHESIOLOGY

## 2020-05-07 PROCEDURE — 77030020753 HC CUF TRNQT 1BLA STRY -B: Performed by: ORTHOPAEDIC SURGERY

## 2020-05-07 PROCEDURE — 76060000032 HC ANESTHESIA 0.5 TO 1 HR: Performed by: ORTHOPAEDIC SURGERY

## 2020-05-07 PROCEDURE — 74011250636 HC RX REV CODE- 250/636: Performed by: ANESTHESIOLOGY

## 2020-05-07 PROCEDURE — 73030 X-RAY EXAM OF SHOULDER: CPT

## 2020-05-07 PROCEDURE — 77030040922 HC BLNKT HYPOTHRM STRY -A: Performed by: ANESTHESIOLOGY

## 2020-05-07 PROCEDURE — 77030003602 HC NDL NRV BLK BBMI -B: Performed by: ANESTHESIOLOGY

## 2020-05-07 PROCEDURE — 77030010509 HC AIRWY LMA MSK TELE -A: Performed by: ANESTHESIOLOGY

## 2020-05-07 PROCEDURE — 76010000138 HC OR TIME 0.5 TO 1 HR: Performed by: ORTHOPAEDIC SURGERY

## 2020-05-07 PROCEDURE — 74011250636 HC RX REV CODE- 250/636: Performed by: NURSE PRACTITIONER

## 2020-05-07 PROCEDURE — 77030011283 HC ELECTRD NDL COVD -A: Performed by: ORTHOPAEDIC SURGERY

## 2020-05-07 PROCEDURE — 83036 HEMOGLOBIN GLYCOSYLATED A1C: CPT

## 2020-05-07 PROCEDURE — 76010010054 HC POST OP PAIN BLOCK: Performed by: ORTHOPAEDIC SURGERY

## 2020-05-07 PROCEDURE — 76942 ECHO GUIDE FOR BIOPSY: CPT | Performed by: ORTHOPAEDIC SURGERY

## 2020-05-07 PROCEDURE — 74011250636 HC RX REV CODE- 250/636: Performed by: NURSE ANESTHETIST, CERTIFIED REGISTERED

## 2020-05-07 PROCEDURE — 76210000016 HC OR PH I REC 1 TO 1.5 HR: Performed by: ORTHOPAEDIC SURGERY

## 2020-05-07 PROCEDURE — 74011000250 HC RX REV CODE- 250: Performed by: NURSE ANESTHETIST, CERTIFIED REGISTERED

## 2020-05-07 PROCEDURE — 77030002916 HC SUT ETHLN J&J -A: Performed by: ORTHOPAEDIC SURGERY

## 2020-05-07 PROCEDURE — 74011000250 HC RX REV CODE- 250: Performed by: ANESTHESIOLOGY

## 2020-05-07 RX ORDER — MIDAZOLAM HYDROCHLORIDE 1 MG/ML
2 INJECTION, SOLUTION INTRAMUSCULAR; INTRAVENOUS
Status: DISCONTINUED | OUTPATIENT
Start: 2020-05-07 | End: 2020-05-07 | Stop reason: HOSPADM

## 2020-05-07 RX ORDER — PROPOFOL 10 MG/ML
INJECTION, EMULSION INTRAVENOUS AS NEEDED
Status: DISCONTINUED | OUTPATIENT
Start: 2020-05-07 | End: 2020-05-07 | Stop reason: HOSPADM

## 2020-05-07 RX ORDER — ONDANSETRON 2 MG/ML
INJECTION INTRAMUSCULAR; INTRAVENOUS AS NEEDED
Status: DISCONTINUED | OUTPATIENT
Start: 2020-05-07 | End: 2020-05-07 | Stop reason: HOSPADM

## 2020-05-07 RX ORDER — GLYCOPYRROLATE 0.2 MG/ML
INJECTION INTRAMUSCULAR; INTRAVENOUS AS NEEDED
Status: DISCONTINUED | OUTPATIENT
Start: 2020-05-07 | End: 2020-05-07 | Stop reason: HOSPADM

## 2020-05-07 RX ORDER — OXYCODONE HYDROCHLORIDE 5 MG/1
5 TABLET ORAL
Status: DISCONTINUED | OUTPATIENT
Start: 2020-05-07 | End: 2020-05-07 | Stop reason: HOSPADM

## 2020-05-07 RX ORDER — FENTANYL CITRATE 50 UG/ML
INJECTION, SOLUTION INTRAMUSCULAR; INTRAVENOUS AS NEEDED
Status: DISCONTINUED | OUTPATIENT
Start: 2020-05-07 | End: 2020-05-07 | Stop reason: HOSPADM

## 2020-05-07 RX ORDER — DEXAMETHASONE SODIUM PHOSPHATE 4 MG/ML
INJECTION, SOLUTION INTRA-ARTICULAR; INTRALESIONAL; INTRAMUSCULAR; INTRAVENOUS; SOFT TISSUE AS NEEDED
Status: DISCONTINUED | OUTPATIENT
Start: 2020-05-07 | End: 2020-05-07 | Stop reason: HOSPADM

## 2020-05-07 RX ORDER — SODIUM CHLORIDE, SODIUM LACTATE, POTASSIUM CHLORIDE, CALCIUM CHLORIDE 600; 310; 30; 20 MG/100ML; MG/100ML; MG/100ML; MG/100ML
100 INJECTION, SOLUTION INTRAVENOUS CONTINUOUS
Status: DISCONTINUED | OUTPATIENT
Start: 2020-05-07 | End: 2020-05-07 | Stop reason: HOSPADM

## 2020-05-07 RX ORDER — CEFAZOLIN SODIUM/WATER 2 G/20 ML
2 SYRINGE (ML) INTRAVENOUS ONCE
Status: COMPLETED | OUTPATIENT
Start: 2020-05-07 | End: 2020-05-07

## 2020-05-07 RX ORDER — OXYCODONE HYDROCHLORIDE 5 MG/1
10 TABLET ORAL
Status: COMPLETED | OUTPATIENT
Start: 2020-05-07 | End: 2020-05-07

## 2020-05-07 RX ORDER — DIPHENHYDRAMINE HYDROCHLORIDE 50 MG/ML
12.5 INJECTION, SOLUTION INTRAMUSCULAR; INTRAVENOUS
Status: DISCONTINUED | OUTPATIENT
Start: 2020-05-07 | End: 2020-05-07 | Stop reason: HOSPADM

## 2020-05-07 RX ORDER — LIDOCAINE HYDROCHLORIDE 20 MG/ML
INJECTION, SOLUTION EPIDURAL; INFILTRATION; INTRACAUDAL; PERINEURAL AS NEEDED
Status: DISCONTINUED | OUTPATIENT
Start: 2020-05-07 | End: 2020-05-07 | Stop reason: HOSPADM

## 2020-05-07 RX ORDER — BUPIVACAINE HYDROCHLORIDE AND EPINEPHRINE 5; 5 MG/ML; UG/ML
INJECTION, SOLUTION EPIDURAL; INTRACAUDAL; PERINEURAL
Status: COMPLETED | OUTPATIENT
Start: 2020-05-07 | End: 2020-05-07

## 2020-05-07 RX ORDER — HYDROMORPHONE HYDROCHLORIDE 2 MG/ML
0.5 INJECTION, SOLUTION INTRAMUSCULAR; INTRAVENOUS; SUBCUTANEOUS
Status: DISCONTINUED | OUTPATIENT
Start: 2020-05-07 | End: 2020-05-07 | Stop reason: HOSPADM

## 2020-05-07 RX ORDER — MIDAZOLAM HYDROCHLORIDE 1 MG/ML
2 INJECTION, SOLUTION INTRAMUSCULAR; INTRAVENOUS ONCE
Status: COMPLETED | OUTPATIENT
Start: 2020-05-07 | End: 2020-05-07

## 2020-05-07 RX ORDER — FENTANYL CITRATE 50 UG/ML
100 INJECTION, SOLUTION INTRAMUSCULAR; INTRAVENOUS ONCE
Status: DISCONTINUED | OUTPATIENT
Start: 2020-05-07 | End: 2020-05-07 | Stop reason: HOSPADM

## 2020-05-07 RX ORDER — NALOXONE HYDROCHLORIDE 0.4 MG/ML
0.1 INJECTION, SOLUTION INTRAMUSCULAR; INTRAVENOUS; SUBCUTANEOUS AS NEEDED
Status: DISCONTINUED | OUTPATIENT
Start: 2020-05-07 | End: 2020-05-07 | Stop reason: HOSPADM

## 2020-05-07 RX ORDER — SODIUM CHLORIDE 0.9 % (FLUSH) 0.9 %
5-40 SYRINGE (ML) INJECTION AS NEEDED
Status: DISCONTINUED | OUTPATIENT
Start: 2020-05-07 | End: 2020-05-07 | Stop reason: HOSPADM

## 2020-05-07 RX ORDER — ONDANSETRON 2 MG/ML
4 INJECTION INTRAMUSCULAR; INTRAVENOUS ONCE
Status: DISCONTINUED | OUTPATIENT
Start: 2020-05-07 | End: 2020-05-07 | Stop reason: HOSPADM

## 2020-05-07 RX ORDER — LIDOCAINE HYDROCHLORIDE 10 MG/ML
0.1 INJECTION INFILTRATION; PERINEURAL AS NEEDED
Status: DISCONTINUED | OUTPATIENT
Start: 2020-05-07 | End: 2020-05-07 | Stop reason: HOSPADM

## 2020-05-07 RX ORDER — SODIUM CHLORIDE 0.9 % (FLUSH) 0.9 %
5-40 SYRINGE (ML) INJECTION EVERY 8 HOURS
Status: DISCONTINUED | OUTPATIENT
Start: 2020-05-07 | End: 2020-05-07 | Stop reason: HOSPADM

## 2020-05-07 RX ORDER — ALBUTEROL SULFATE 0.83 MG/ML
2.5 SOLUTION RESPIRATORY (INHALATION) AS NEEDED
Status: DISCONTINUED | OUTPATIENT
Start: 2020-05-07 | End: 2020-05-07 | Stop reason: HOSPADM

## 2020-05-07 RX ADMIN — HYDROMORPHONE HYDROCHLORIDE 0.5 MG: 2 INJECTION INTRAMUSCULAR; INTRAVENOUS; SUBCUTANEOUS at 10:31

## 2020-05-07 RX ADMIN — ONDANSETRON 4 MG: 2 INJECTION INTRAMUSCULAR; INTRAVENOUS at 09:33

## 2020-05-07 RX ADMIN — FENTANYL CITRATE 100 MCG: 50 INJECTION INTRAMUSCULAR; INTRAVENOUS at 09:00

## 2020-05-07 RX ADMIN — MIDAZOLAM 2 MG: 1 INJECTION INTRAMUSCULAR; INTRAVENOUS at 08:41

## 2020-05-07 RX ADMIN — SODIUM CHLORIDE, SODIUM LACTATE, POTASSIUM CHLORIDE, AND CALCIUM CHLORIDE 100 ML/HR: 600; 310; 30; 20 INJECTION, SOLUTION INTRAVENOUS at 07:08

## 2020-05-07 RX ADMIN — GLYCOPYRROLATE 0.2 MG: 0.2 INJECTION, SOLUTION INTRAMUSCULAR; INTRAVENOUS at 09:36

## 2020-05-07 RX ADMIN — HYDROMORPHONE HYDROCHLORIDE 0.5 MG: 2 INJECTION INTRAMUSCULAR; INTRAVENOUS; SUBCUTANEOUS at 10:22

## 2020-05-07 RX ADMIN — OXYCODONE 10 MG: 5 TABLET ORAL at 10:26

## 2020-05-07 RX ADMIN — PROPOFOL 200 MG: 10 INJECTION, EMULSION INTRAVENOUS at 09:00

## 2020-05-07 RX ADMIN — HYDROMORPHONE HYDROCHLORIDE 0.5 MG: 2 INJECTION INTRAMUSCULAR; INTRAVENOUS; SUBCUTANEOUS at 10:17

## 2020-05-07 RX ADMIN — HYDROMORPHONE HYDROCHLORIDE 0.5 MG: 2 INJECTION INTRAMUSCULAR; INTRAVENOUS; SUBCUTANEOUS at 10:42

## 2020-05-07 RX ADMIN — BUPIVACAINE HYDROCHLORIDE AND EPINEPHRINE BITARTRATE 30 ML: 5; .005 INJECTION, SOLUTION EPIDURAL; INTRACAUDAL; PERINEURAL at 08:44

## 2020-05-07 RX ADMIN — DEXAMETHASONE SODIUM PHOSPHATE 8 MG: 4 INJECTION, SOLUTION INTRAMUSCULAR; INTRAVENOUS at 09:13

## 2020-05-07 RX ADMIN — SODIUM CHLORIDE, SODIUM LACTATE, POTASSIUM CHLORIDE, AND CALCIUM CHLORIDE: 600; 310; 30; 20 INJECTION, SOLUTION INTRAVENOUS at 09:37

## 2020-05-07 RX ADMIN — Medication 2 G: at 08:58

## 2020-05-07 RX ADMIN — LIDOCAINE HYDROCHLORIDE 100 MG: 20 INJECTION, SOLUTION EPIDURAL; INFILTRATION; INTRACAUDAL; PERINEURAL at 09:00

## 2020-05-07 NOTE — PROGRESS NOTES
Johnna Mace called and notified that she can start driving to the hospital. Patient will be in discharge area in about 30 mins.

## 2020-05-07 NOTE — ANESTHESIA POSTPROCEDURE EVALUATION
Procedure(s):  RIGHT ELBOW OPEN ULNAR NERVE DECOMPRESSION / TRANSPOSITION / SUPRACLAVICULAR BLOCK.    general    Anesthesia Post Evaluation      Multimodal analgesia: multimodal analgesia used between 6 hours prior to anesthesia start to PACU discharge  Patient location during evaluation: PACU  Patient participation: complete - patient participated  Level of consciousness: awake and awake and alert  Pain management: adequate  Airway patency: patent  Anesthetic complications: no  Cardiovascular status: acceptable  Respiratory status: acceptable  Hydration status: acceptable  Post anesthesia nausea and vomiting:  controlled      Vitals Value Taken Time   /88 5/7/2020 10:56 AM   Temp 36.5 °C (97.7 °F) 5/7/2020 10:31 AM   Pulse 73 5/7/2020 10:56 AM   Resp 18 5/7/2020 10:56 AM   SpO2 97 % 5/7/2020 10:56 AM

## 2020-05-07 NOTE — H&P
Date of Surgery Update: Radha Valle was seen and examined. History and physical has been reviewed. The patient has been examined.  There have been no significant clinical changes since the completion of the originally dated History and Physical.    Signed By: Dasia Titus MD     May 7, 2020 6:19 AM

## 2020-05-07 NOTE — ANESTHESIA PROCEDURE NOTES
Peripheral Block    Start time: 5/7/2020 8:42 AM  End time: 5/7/2020 8:44 AM  Performed by: Marge Polk MD  Authorized by: Marge Polk MD       Pre-procedure:    Indications: at surgeon's request and post-op pain management    Preanesthetic Checklist: patient identified, risks and benefits discussed, site marked, timeout performed, anesthesia consent given and patient being monitored    Timeout Time: 08:42          Block Type:   Block Type:  Supraclavicular  Laterality:  Right  Monitoring:  Standard ASA monitoring, continuous pulse ox, frequent vital sign checks, heart rate, oxygen and responsive to questions  Injection Technique:  Single shot  Procedures: ultrasound guided    Patient Position: supine  Prep: chlorhexidine    Needle Type:  Stimuplex  Needle Gauge:  21 G  Needle Localization:  Ultrasound guidance and anatomical landmarks    Assessment:  Number of attempts:  1  Injection Assessment:  Incremental injection every 5 mL, local visualized surrounding nerve on ultrasound, negative aspiration for blood, no paresthesia, no intravascular symptoms and ultrasound image on chart  Patient tolerance:  Patient tolerated the procedure well with no immediate complications

## 2020-05-07 NOTE — DISCHARGE INSTRUCTIONS
After general anesthesia or intravenous sedation, for 24 hours or while taking prescription Narcotics:  · Limit your activities  · A responsible adult needs to be with you for the next 24 hours  · Do not drive and operate hazardous machinery  · Do not make important personal or business decisions  · Do not drink alcoholic beverages  · If you have not urinated within 8 hours after discharge, please contact your surgeon on call. · If you have sleep apnea and have a CPAP machine, please use it for all naps and sleeping. · Please use caution when taking narcotics and any of your home medications that may cause drowsiness. *  Please give a list of your current medications to your Primary Care Provider. *  Please update this list whenever your medications are discontinued, doses are      changed, or new medications (including over-the-counter products) are added. *  Please carry medication information at all times in case of emergency situations. These are general instructions for a healthy lifestyle:  No smoking/ No tobacco products/ Avoid exposure to second hand smoke  Surgeon General's Warning:  Quitting smoking now greatly reduces serious risk to your health. Obesity, smoking, and sedentary lifestyle greatly increases your risk for illness  A healthy diet, regular physical exercise & weight monitoring are important for maintaining a healthy lifestyle    You may be retaining fluid if you have a history of heart failure or if you experience any of the following symptoms:  Weight gain of 3 pounds or more overnight or 5 pounds in a week, increased swelling in our hands or feet or shortness of breath while lying flat in bed. Please call your doctor as soon as you notice any of these symptoms; do not wait until your next office visit.

## 2020-05-07 NOTE — OP NOTES
[address] [address]  OPERATIVE REPORT    Name:  Liana Stuart  MR#:  [id number]  :  1960  ACCOUNT #:  [de-identified]  DATE OF SERVICE:  2020    PREOPERATIVE DIAGNOSIS:  Cubital tunnel syndrome, right elbow. POSTOPERATIVE DIAGNOSIS:  Cubital tunnel syndrome, right elbow. PROCEDURE PERFORMED:  Ulnar nerve decompression, anterior transposition, right elbow. SURGEON:  Jaylin Dumont. Rosalia Rouse MD    FIRST ASSISTANTMartin Hopper NP. Use of a first assistant was necessary to optimize the patient's safety and outcome. First assistant was present during the entire procedure, assisted in identifying and protecting the nerve and performing the transposition. ANESTHESIA:  General with a supraclavicular block. COMPLICATIONS:  None. ESTIMATED BLOOD LOSS:  5 mL. PATHOLOGY:  Cubital tunnel syndrome. CPT CODE:  89703 with a modifier AS for assistant surgeon. ICD-10 CODE:  G56.21.    TOURNIQUET TIME:  24 minutes. INDICATIONS:  The patient is a 51-year-old gentleman, who injured his right shoulder on the job. The patient underwent a reverse shoulder arthroplasty. Postop course has been complicated by numbness and tingling in the ulnar nerve distribution. EMG/nerve conduction studies demonstrate ulnar nerve compression at the elbow. He is now electively admitted for ulnar nerve decompression, anterior transposition. PROCEDURE:  Following identification of the patient, the patient was taken to the operative suite. Following administration of general anesthesia, supraclavicular block for postop pain control, and 2 g of IV Ancef, the patient was very carefully positioned on the operative table in the supine fashion. Care was taken to protect his right total shoulder arthroplasty. The right arm was then prepped and draped in the sterile fashion. Right arm was then elevated and exsanguinated with an Esmarch bandage. Tourniquet was elevated to 250 mmHg.   At this point, a 6 cm incision based over the medial epicondyle was then performed. Skin was incised. Subcutaneous tissue was then dissected down to the ulnar nerve. Ulnar nerve was identified and protected throughout the procedure. It was markedly compressed across the cubital tunnel. The ulnar nerve was then carefully dissected free proximally from the ligament of Hamilton all the way through the FCU. It was then easily mobilizable and easily transposed anteriorly. The intermuscular septum was released roughly 1 cm. At this point, a fascial sleeve was then created from the flexor pronator mass. The ulnar nerve was then transposed anteriorly. Fascial sleeve was approximated and secured utilizing 0 Vicryl figure-of-eight sutures. At this point, the arm was put through range of motion. Ulnar nerve was decompressed. There was no evident further compression and the ulnar nerve was stable. The wound was irrigated and closed with 0-Vicryl figure-of-eight sutures and 2-0 nylon horizontal mattress sutures. Tourniquet was released. Tourniquet time was 24 minutes. A sterile dressing was applied. A posterior splint was applied. The patient was then transferred to the recovery room in stable condition. This injury was secondary to workplace injury. MD CHRISTINA Perry/[id number]  D:  05/07/2020 9:53  T:  05/07/2020 13:53  JOB #:  [id number]  CC:   Gerome Chill, MD Lacretia Jeans, NP

## 2020-06-16 ENCOUNTER — HOSPITAL ENCOUNTER (OUTPATIENT)
Dept: PHYSICAL THERAPY | Age: 60
Discharge: HOME | End: 2020-06-16
Payer: COMMERCIAL

## 2020-06-16 PROCEDURE — 97035 APP MDLTY 1+ULTRASOUND EA 15: CPT

## 2020-06-16 PROCEDURE — 97140 MANUAL THERAPY 1/> REGIONS: CPT

## 2020-06-16 PROCEDURE — 97163 PT EVAL HIGH COMPLEX 45 MIN: CPT

## 2020-06-16 NOTE — THERAPY EVALUATION
Deena Cali  : 1960  Primary: [facility] [employer]  Secondary:  [address] at [address] [place] [address] Rd  1101 [facility], [address] [place] [address],[address], [facility] [address].  [phone]   [phone]       Tray Kasper Assessment 2020     ICD-10: Treatment Diagnosis: R elbow pain M25.521, R shoulder pain M25.511  Precautions/Allergies:   Patient has no known allergies. TREATMENT PLAN:  Effective Dates: 2020 TO 2020 (90 days)  Frequency/Duration: 3 times a week for to start and then will start at 2x week when able. for  90 Day(s) MEDICAL/REFERRING DIAGNOSIS:  right elbow, right shoulder    DATE OF ONSET: 2020  REFERRING PHYSICIAN: Melchor Forbes MD MD Orders: eval and treat, ROM, strengthening  Return MD Appointment: 2020     INITIAL ASSESSMENT:  Mr. Morales ~ 6 weeks s/p L ulnar nerve transfer and 6-7 months s/p R reverse TSR. Pain (mostly at R elbow and hand), swelling, R UE weakness secondary to pain and shoulder decreased ROM secondary to neural tension are limiting normalized use and function of R UE. He is unable to work at this time secondary to arm pain and seems anxious to return to work. He will benefit from PT for guided post-op shoulder rehab to promote safe return to normalized use of the UE with work related activities. PROBLEM LIST (Impacting functional limitations):  1. Post-op R elbow and hand pain and swelling  2. Decreased R shoulder ROM   3. + neural tension   4. Decreased functional use and strength of R arm   INTERVENTIONS PLANNED: (Treatment may consist of any combination of the following)  1. Thermal and electric modalities, manual therapies for pain   2. Manual therapies, therapeutic exercises, HEP for ROM    3. Therapeutic exercises and HEP for strength     GOALS: (Goals have been discussed and agreed upon with patient.)  Short-Term Functional Goals: Time Frame: 4 weeks  1.  Able to return to at least 1/2 amount of normal sleep.  2. Report no more than 4/10 intermittent pain to R elbow with compensatory use during basic functional activities. 3. R shoulder AROM forward elevation no longer painful to elbow and hand  4. Negative R medial nerve neural tension and close to negative for ulnar and radial nerve  5. Independent with initial HEP. Discharge Goals: Time Frame: 6-8 weeks  1. No more than 2/10 intermittent pain R elbow and shoulder with return to normalized household and work activities, and score less than 80% on the DASH. 2. Negative L shoulder tension testing. 3. Demonstrate good functional shoulder strength and endurance for return to normalized household and work activities. 4. Independent with advanced shoulder HEP for continued self-management. OUTCOME MEASURE:   Tool Used: Disabilities of the Arm, Shoulder and Hand (DASH) Questionnaire - Quick Version  Score:  Initial: 45/55  Most Recent: X/55 (Date: -- )   Interpretation of Score: The DASH is designed to measure the activities of daily living in person's with upper extremity dysfunction or pain. Each section is scored on a 1-5 scale, 5 representing the greatest disability. The scores of each section are added together for a total score of 55. This number is divided by 11, followed by subtracting 1 and multiplying by 25 to get a percent score of disability. This value represents the percentage disability: 0-20% minimal disability; 20-40% moderate disability; 40-60% severe disability; % dependent for care or exaggerated symptom behavior. Minimal detectable change is 12%. MEDICAL NECESSITY:   · Patient is expected to demonstrate progress in range of motion and pain, functional use of R arm to increase independence with work activities. .  REASON FOR SERVICES/OTHER COMMENTS:  · Patient continues to require skilled intervention due to amount of pain in R elbow and hand and lack of use with R arm. .  Total Duration:       Rehabilitation Potential For Stated Goals: Good  Regarding Horacio Wyatti Alfonso's therapy, I certify that the treatment plan above will be carried out by a therapist or under their direction. Thank you for this referral,    Berrtam Nogueira, PT       Referring Physician Signature: Jarrod Bassett MD _______________________________ Date _____________       PAIN/SUBJECTIVE:   Initial:   7/10 Post Session:  5-6/10   HISTORY:   History of Injury/Illness (Reason for Referral): Had TSR on Santino 10, 2020 and this caused some nerve dysfunction. Had follow up surgery for ulnar nerve decompression on 5/7/2020. He states that soon after 6/9/2020 he started having increasing discomfort in R medial forearm that makes it difficult to use or place arm on anything. He is anxious to get back to work. Does not want to take pain meds. Pt states icing is not helping at this time. Past Medical History/Comorbidities:   Mr. Bette Taylor  has a past medical history of Drug addiction ([address] Eczema, Hiatal hernia, Kidney stones, Osteoarthritis of right glenohumeral joint (1/5/2020), and Rotator cuff tear. He also has no past medical history of Aneurysm ([address] Coagulation defects, or COPD. Mr. Bette Taylor  has a past surgical history that includes hx knee arthroscopy (Right); upper arm/elbow surgery unlisted (Left); hx orthopaedic (Left shoulder RTC scope 1990's); and hx shoulder replacement (Right, 01/2020). Social History/Living Environment: Lives with spouse and 3 young children. One child has autism. 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 Dominant Side:         RIGHT  Previous Treatment Approaches:          Pt states receiving limited amount of PT for R TSR secondary to covid 19.    Personal Factors:          Profession:  Maintenance   Ambulatory/Rehab Services H2 Model Falls Risk Assessment   Risk Factors:       (1)  Gender [Male] Ability to Rise from Chair:       (0)  Ability to rise in a single movement   Falls Prevention Plan:       No modifications necessary   Total: (5 or greater = High Risk): 1   ©2010 [facility] [employer] [facility] Patent #0,[id number]. Federal Law prohibits the replication, distribution or use without written permission from [facility] [employer]   Current Medications:       Current Outpatient Medications:     traMADoL (ULTRAM) 50 mg tablet, Take 50 mg by mouth every six (6) hours as needed for Pain., Disp: , Rfl:     temazepam (RESTORIL) 15 mg capsule, Take 15 mg by mouth nightly as needed for Sleep., Disp: , Rfl:    Date Last Reviewed:  6/16/2020   Number of Personal Factors/Comorbidities that affect the Plan of Care: 3+: HIGH COMPLEXITY   EXAMINATION:   Observation/Orthostatic Postural Assessment:  Pt comes to clinic is distress about the amount of numbness and sharp pain he is having in his R elbow and hand. Sutures are intact, wound edges approximated, and there is no drainage or redness noted. R medial elbow appears swollen. Palpation:    R medial elbow extremely tender at scar  Tender and tight and R scalenes, Stiff L 1st rib and C7  ROM: Cervical ROM decreased with R rotation = 65% and SB R ~ 40% and painful with both            L shoulder flexion AROM = 160, R = 140 and painful. JENNIFER shoulder ABD > 140 but when he side bends neck to the L, R shoulder = 90 degrees and painful           R shoulder functional IR = to mid R buttock           R hand not able to make a fist - DIP/PIP only getting to ~90 degrees for each finger. Strength: Strong and painful for shoulder flexion, abduction, ER. Mild weakness to IR. Special Tests: + Cerv rotation with FF for R elevated 1st rib  Neurological Screen:   Myotomes:   Dermatomes: decreased sensation to R arm at C6,7 and T1  Reflexes: not assessed today  Neural Tension Tests:+ R upper limb tension test  #1-4.   For median nerve: unable to get to 90/90 with   Functional Mobility:   Independent with compensation for pain with  basic mobility and with dressing and grooming ADL's. Unable to do work required functions that require lift, push-pull activities with R UE. Body Structures Involved:  1. Nerves  2. Joints  3. Muscles Body Functions Affected:  1. Neuromusculoskeletal  2. Movement Related Activities and Participation Affected:  1. General Tasks and Demands  2. Mobility  3.  Domestic Life  4. job requirments   Number of elements (examined above) that affect the Plan of Care: 4+: HIGH COMPLEXITY   CLINICAL PRESENTATION:   Presentation: Evolving clinical presentation with unstable and unpredictable characteristics: HIGH COMPLEXITY   CLINICAL DECISION MAKING:   Use of outcome tool(s) and clinical judgement create a POC that gives a: Difficult prediction of patient's progress: HIGH COMPLEXITY

## 2020-06-16 NOTE — PROGRESS NOTES
Serenity Sommers  : 1960  Payor: Flor Martin / Plan: [place] [employer] / Product Type: Workers Comp /  [address]  at [facility] RENETTA CONDE  1101 [facility], [address] [place] [address],[address], [address] [facility]  [phone]   [phone]       OUTPATIENT PHYSICAL THERAPY: Daily Treatment Note 2020  Visit Count: 1  ICD-10: Treatment Diagnosis: R elbow pain M25.521, R shoulder pain M25.511  Precautions/Allergies:   Patient has no known allergies. TREATMENT PLAN:  Effective Dates: 2020 TO 2020 (90 days)  Frequency/Duration: 3 times a week for to start and then will start at 2x week when able. for  90 Day(s) MEDICAL/REFERRING DIAGNOSIS:  right elbow, right shoulder    DATE OF ONSET: 2020  REFERRING PHYSICIAN: Jarrod Bassett MD MD Orders: eval and treat, ROM, strengthening  Return MD Appointment: 2020              Pre-treatment Symptoms/Complaints:  R hand tingling and stiffness, R elbow throbbing pain. Unable to sleep because pain is so bad. Also unable to rest R arm on anything because elbow is still senstive. Has been trying ice at home  Pain: Initial:   7/10 Post Session:  -6/10   Medications Last Reviewed:  2020    Updated Objective Findings:   See evaluation note from today     TREATMENT:     Modalities: (8 min) pulsed us 20% at 1.5 cm2 and 1.0 depth  For decreasing inflammation to area  Manual (15 min)  --myofascial release to R scalenes, [employer] for C7 dysfunction and then R 1st rib inferior glide mob and [employer] for T1  -- R shoulder median and radial nerve glide in pain free ranged 20 x each    HEP: Pt to start self median nerve mobilization in 90/90 position x 20. Also discussed use of heat and ice. [employer] Portal    Treatment/Session Summary:    · Response to Treatment:  pt still noticeably in pain but did state that US and manual intervention did take some off his pain down.  .  · Communication/Consultation:  None today  · Equipment provided today:  None today  · Recommendations/Intent for next treatment session: Next visit will focus on addressing pain and swelling in L elbow, possible scar tissue in this area along with muscle spasm in R neck/1st rib area.     Total Treatment Billable Duration:  23 min  PT Patient Time In/Time Out  Time In: 1110  Time Out: [address], PT    Future Appointments   Date Time Provider Ced Hope   6/17/2020 11:00 AM Juan José Reyes [facility]   6/19/2020  1:30 PM Farideh Arms, PT [facility]   6/23/2020 10:00 AM Farideh Arms, PT [facility]   6/25/2020 11:00 AM Farideh Arms, PT [facility]   6/26/2020 11:00 AM Farideh Arms, PT [facility]   6/30/2020 11:00 AM Farideh Arms, PT [facility]   7/2/2020  1:00 PM Farideh Arms, PT [facility]

## 2020-06-17 ENCOUNTER — HOSPITAL ENCOUNTER (OUTPATIENT)
Dept: PHYSICAL THERAPY | Age: 60
Discharge: HOME | End: 2020-06-17
Payer: COMMERCIAL

## 2020-06-17 PROCEDURE — 97035 APP MDLTY 1+ULTRASOUND EA 15: CPT

## 2020-06-17 PROCEDURE — 97110 THERAPEUTIC EXERCISES: CPT

## 2020-06-17 NOTE — PROGRESS NOTES
Monique Fuentes  : 1960  Payor: Saravanan Friend / [place]: [place] [employer] / Product Type: Workers Comp /  [address]  at [facility] [place]ENETTA CONDE  1101 [facility], [address] [place] [address],[address], [facility] [address]  [phone]   [phone]       OUTPATIENT PHYSICAL THERAPY: Daily Treatment Note 2020  Visit Count: 2  ICD-10: Treatment Diagnosis: R elbow pain M25.521, R shoulder pain M25.511  Precautions/Allergies:   Patient has no known allergies. TREATMENT PLAN:  Effective Dates: 2020 TO 2020 (90 days)  Frequency/Duration: 3 times a week for to start and then will start at 2x week when able. for  90 Day(s) MEDICAL/REFERRING DIAGNOSIS:  right elbow, right shoulder    DATE OF ONSET: 2020  REFERRING PHYSICIAN: Roshni Forbes MD MD Orders: eval and treat, ROM, strengthening  Return MD Appointment: 2020              Pre-treatment Symptoms/Complaints:  R hand tingling and numb over the pinky side. R elbow sensitive with throbbing pain. Exercises make it hurt. Wanting to get back to work  Pain: Initial:   8-9/10 Post Session:  5/10   Medications Last Reviewed:  2020    Updated Objective Findings:   Observation: pt moving the right elbow and hand in quick forceful movements \"to push it\". Right shoulder upper trap and bicep hypertrophy and in guarded state. Arm held with elbow flexed and hand pronated  ROM:                RUE AROM  R Elbow Extension: -17(-4 after treatment)  R Forearm Supination: 70(83 after treatment)  R Wrist Extension: 50(at end ROM elbow extn. 60 after)                      TREATMENT:     Modalities: (10 min) pulsed ultrasound 50% at 1.0 cm2 for mechanical effects on tissue prior to treatment. Kinesio taping for facilitation of the tricep and I strip for medial epicondylitis and mechanical correction. Manual ( min): none  Therapeutic Exercise: (29 Minutes):  Exercises to improve mobility and strength.   Required moderate verbal and manual cues to promote proper body posture and exercise technique. PROM to grade 4- physiological mobilizations of the right elbow extension, forearm supination, wrist and finger extn. Instructed pt on HEP with standing right UE ER/IR with attempting full elbow extn and forearm supination, making a fist  With wrist slightly flexed at end ROM elbow extn and supination. Gentle desensitization using lotion and progressing to a warm damp wash cloth along the posterior medial elbow to the end of the 5th finger   Date:  6/17/20 Date:   Date:     Activity/Exercise Parameters Parameters Parameters    tricep Manual resist 2x10     supination Manual resist 2x10     Wrist extn Manual resist 2x10                                 HEP: Pt to hold self median nerve mobilization due to amount of pain. Begin standing shoulder ER/IR with gentle effort to supinate and extend elbow; making a fist with slight wrist flexion end ROM elbow extn and supination. Densensitize as reviewed  [facility] Portal    Treatment/Session Summary:    · Response to Treatment:  Pt with decreased pain and much improved elbow extn and supination after treatment. Fisting improved with wrist in slight flexion . · Communication/Consultation:  None today  · Equipment provided today:  None today  · Recommendations/Intent for next treatment session: Next visit will focus on elbow, forearm, wrist and finger active ROM with respect to pain, muscle facilitation and pain modalities as needed. Assess benefit of kinesio taping. ** Pt's goal is to be able to reach into his right back pocket.     Total Treatment Billable Duration:  39 min  PT Patient Time In/Time Out  Time In: 1100  Time Out: 1431 [facility], PT    Future Appointments   Date Time Provider Ced Hope   6/19/2020  1:30 PM [facility]any Corona, [facility]   6/23/2020 10:00 AM [facility], PT [facility]   6/25/2020 11:00 AM [facility], PT [facility]   6/26/2020 11:00 AM Hany Corona, PT [facility]   6/30/2020 11:00 AM Scar Keys, [facility]   7/2/2020  1:00 PM Scar Keys[facility]

## 2020-06-19 ENCOUNTER — HOSPITAL ENCOUNTER (OUTPATIENT)
Dept: PHYSICAL THERAPY | Age: 60
Discharge: HOME | End: 2020-06-19
Payer: COMMERCIAL

## 2020-06-19 PROCEDURE — 97035 APP MDLTY 1+ULTRASOUND EA 15: CPT

## 2020-06-19 PROCEDURE — 97140 MANUAL THERAPY 1/> REGIONS: CPT

## 2020-06-19 NOTE — PROGRESS NOTES
Gus Clark  : 1960  Payor: Taylor Unger / [place] [employer] / Product Type: Workers Comp /  [address] [place]  at [facility] [place]TA CONDE  1101 [facility], [address] [place] [address],[address], [facility] [address]  [phone]   [phone]       OUTPATIENT PHYSICAL THERAPY: Daily Treatment Note 2020  Visit Count: 3  ICD-10: Treatment Diagnosis: R elbow pain M25.521, R shoulder pain M25.511  Precautions/Allergies:   Patient has no known allergies. TREATMENT PLAN:  Effective Dates: 2020 TO 2020 (90 days)  Frequency/Duration: 3 times a week for to start and then will start at 2x week when able. for  90 Day(s) MEDICAL/REFERRING DIAGNOSIS:  right elbow, right shoulder    DATE OF ONSET: 2020  REFERRING PHYSICIAN: Shlomo Nicole MD MD Orders: eval and treat, ROM, strengthening  Return MD Appointment: 2020              Pre-treatment Symptoms/Complaints:  CONtinues with c/o numbness from R elbow down to R hand over the pinky side. elbow still sensitive with throbbing pain. Pain: Initial:   10 Post Session:  6/10   Medications Last Reviewed:  2020    Updated Objective Findings:   Observation: pt to have guarding of R arm  ROM:fisting motion improved from IE but still lacking full PIP flexion and strength with . RUE AROM  R Elbow Extension: -4  R Forearm Supination: 75                      TREATMENT:     Modalities: (12 min) continuous ultrasound at 1.0 cm2 for mechanical effects on tissue prior to treatment. Manual ( 28 min):   --  myofascial release along distal ulnar nerve, radial nerve at distal triceps and at pec minor  --  Kinesio taping for facilitation of the tricep and I strip for medial epicondylitis and mechanical correction. Therapeutic Exercise: ( 5 min): brief review of this HEP. Pt is to continue along with education to self massage hand and forearm. Exercises to improve mobility and strength.   Required moderate verbal and manual cues to promote proper body posture and exercise technique. PROM to grade 4- physiological mobilizations of the right elbow extension, forearm supination, wrist and finger extn. Instructed pt on HEP with standing right UE ER/IR with attempting full elbow extn and forearm supination, making a fist  With wrist slightly flexed at end ROM elbow extn and supination. Gentle desensitization using lotion and progressing to a warm damp wash cloth along the posterior medial elbow to the end of the 5th finger   Date:  6/17/20 Date:   Date:     Activity/Exercise Parameters Parameters Parameters    tricep Manual resist 2x10     supination Manual resist 2x10     Wrist extn Manual resist 2x10                                 HEP: Pt to hold self median nerve mobilization due to amount of pain. Begin standing shoulder ER/IR with gentle effort to supinate and extend elbow; making a fist with slight wrist flexion end ROM elbow extn and supination. Densensitize as reviewed  [place] Portal    Treatment/Session Summary:    · Response to Treatment:  Pt with continued reports of finger numbness but stated less sensitivity in forearm. · Communication/Consultation:  None today  · Equipment provided today:  None today  · Recommendations/Intent for next treatment session: Next visit will focus on elbow, forearm, wrist and finger active ROM with respect to pain, muscle facilitation and pain modalities as needed. Pt's goal is to be able to reach into his right back pocket. Address sleep if able.      Total Treatment Billable Duration:  45 min  PT Patient Time In/Time Out  Time In: 1330  Time Out: 150[address] [place] [address],     Future Appointments   Date Time Provider Ced Hope   6/23/2020 10:00 AM R[facility]   6/25/2020 11:00 AM [facility]   6/26/2020 11:00 AM RIVERA [facility]   6/30/2020 11:00 AM [facility]   7/2/2020  1:00 PM Simona Michael, PT [facility]

## 2020-06-23 ENCOUNTER — HOSPITAL ENCOUNTER (OUTPATIENT)
Dept: PHYSICAL THERAPY | Age: 60
Discharge: HOME | End: 2020-06-23
Payer: COMMERCIAL

## 2020-06-23 PROCEDURE — 97140 MANUAL THERAPY 1/> REGIONS: CPT

## 2020-06-23 PROCEDURE — 97035 APP MDLTY 1+ULTRASOUND EA 15: CPT

## 2020-06-23 NOTE — PROGRESS NOTES
Clyde Melgoza  : 1960  Payor: Carlito Wilhelm / Plan: [place] [employer] / Product Type: Workers Comp /  [address] [place]  at [address] [place] [address] Rd  [address] [facility], [address] [place] [address],[address], [facility] [address].  [phone]   [phone]       OUTPATIENT PHYSICAL THERAPY: Daily Treatment Note 2020  Visit Count: 4  ICD-10: Treatment Diagnosis: R elbow pain M25.521, R shoulder pain M25.511  Precautions/Allergies:   Patient has no known allergies. TREATMENT PLAN:  Effective Dates: 2020 TO 2020 (90 days)  Frequency/Duration: 3 times a week for to start and then will start at 2x week when able. for  90 Day(s) MEDICAL/REFERRING DIAGNOSIS:  right elbow, right shoulder    DATE OF ONSET: 2020  REFERRING PHYSICIAN: Tomas Kraus MD MD Orders: eval and treat, ROM, strengthening  Return MD Appointment: 2020              Pre-treatment Symptoms/Complaints:  Pain did better until . Swelling returned on . CONtinues with c/o numbness from R elbow down to R hand over the pinky side. elbow still sensitive with throbbing pain. Pain: Initial:   7/10 Post Session:  5/10   Medications Last Reviewed:  2020    Updated Objective Findings:   Observation: pt to have guarding of R arm  ROM:fisting motion improved from IE but still lacking full PIP flexion and strength with . RUE AROM  R Elbow Extension: (almost equal to L side)  R Forearm Supination: 80  R Wrist Extension: 50(with elbow extended and 60 with elbow flexed to 90)                      TREATMENT:     Modalities: (12 min) continuous ultrasound at 1.0 cm2 for mechanical effects on tissue prior to treatment to L forearm along ulnar nerve and at surgical inscion area.   Manual ( 28 min):   --  myofascial release along distal to proximal ulnar nerve up to mid forearm and at pec minor  -- R wrist extension mobilization with elbow bent  -- R medial nerve mobilization at 90/90   --  Kinesio taping for facilitation of the tricep and basket weave at elbow at place of swelling. Pt instructed to wear tape for 48 hours or if any redness or itching of skin occurred. Pt verbally agreed. Therapeutic Exercise: ( 5 min): brief review of HEP below. Exercises to improve mobility and strength. Required moderate verbal and manual cues to promote proper body posture and exercise technique. PROM to grade 4- physiological mobilizations of the right elbow extension, forearm supination, wrist and finger extn. Instructed pt on HEP with standing right UE ER/IR with attempting full elbow extn and forearm supination, making a fist  With wrist slightly flexed at end ROM elbow extn and supination. Gentle desensitization using lotion and progressing to a warm damp wash cloth along the posterior medial elbow to the end of the 5th finger   Date:  6/17/20 Date:  6/23/20 Date:     Activity/Exercise Parameters Parameters Parameters    tricep Manual resist 2x10 Against table  5x    supination Manual resist 2x10     Wrist extn Manual resist 2x10 10x    Scapula retraction  10x                          HEP: Pt to hold self median nerve mobilization due to amount of pain. Begin standing shoulder ER/IR with gentle effort to supinate and extend elbow; making a fist with slight wrist flexion end ROM elbow extn and supination. Densensitize as reviewed  [employer]    Treatment/Session Summary:    · Response to Treatment:  Pain levels decreased to 5/10 but still sensitive. Wrist extension improved to ~ 75 degrees A/PROM. · Communication/Consultation:  None today  · Equipment provided today:  None today  · Recommendations/Intent for next treatment session: Next visit will focus on elbow, forearm, wrist and finger active ROM with respect to pain, muscle facilitation and pain modalities as needed. Pt's goal is to be able to reach into his right back pocket. Address sleep if able.     Total Treatment Billable Duration:  45 min  PT Patient Time In/Time Out  Time In: 1003  Time Out: Chelsy Tiwari, PT    Future Appointments   Date Time Provider [place]   6/25/2020 11:00 AM Joni Venegas, PT [facility]   6/26/2020 11:00 AM Joni English, PT [facility]   6/30/2020 11:00 AM Joni English, PT [facility]   7/2/2020  1:00 PM [facility]

## 2020-06-26 ENCOUNTER — HOSPITAL ENCOUNTER (OUTPATIENT)
Dept: PHYSICAL THERAPY | Age: 60
Discharge: HOME | End: 2020-06-26
Payer: COMMERCIAL

## 2020-06-26 NOTE — PROGRESS NOTES
Amanda Hernandez  : 1960  Primary: Vitaliy Everardo [employer] Workers [employer]ens*  Secondary: [employer] [facility] [employer] [facility] [place] at [address], [address] [place] [address]  [phone]   [phone]      Pt still did not come today and had cancelled visit yesterday secondary to having a fever.     Ebenezer Lovett, PT MSPT

## 2020-06-30 ENCOUNTER — APPOINTMENT (OUTPATIENT)
Dept: PHYSICAL THERAPY | Age: 60
End: 2020-06-30
Payer: COMMERCIAL

## 2020-07-02 ENCOUNTER — APPOINTMENT (OUTPATIENT)
Dept: PHYSICAL THERAPY | Age: 60
End: 2020-07-02

## 2021-02-02 NOTE — THERAPY DISCHARGE
Clyde Melgoza  : 1960  Primary: [employer]  Secondary:  [address]  at [facility] RENETTA CONDE  [address] [facility], [address] [place] [address],[address], [address] [facility]  [phone]   [phone]       OUTPATIENT PHYSICAL THERAPY:Discontinuation Summary 2020     ICD-10: Treatment Diagnosis: R elbow pain M25.521, R shoulder pain M25.511  Precautions/Allergies:   Patient has no known allergies. TREATMENT PLAN:  Effective Dates: 2020 TO 2020 (90 days)  Frequency/Duration: 3 times a week for to start and then will start at 2x week when able. for  90 Day(s) MEDICAL/REFERRING DIAGNOSIS:  right elbow, right shoulder    DATE OF ONSET: 2020  REFERRING PHYSICIAN: Aj Forbes MD MD Orders: eval and treat, ROM, strengthening  Return MD Appointment: 2020     DISCOUNTINUATION ASSESSMENT: Clyde eMlgoza has been seen in physical therapy from 20  to 20 for 4 visits. Treatment has been discontinued at this time due to patient was out of PT initially because of kathrin COVID, but then his elbow improved while he was away and he did not need to return for further appointmets. Unknown if goals were not met due to unable to reassess patient since he did not continue.    Thank you for this referral.

## 2022-03-18 PROBLEM — M19.011 OSTEOARTHRITIS OF RIGHT GLENOHUMERAL JOINT: Status: ACTIVE | Noted: 2020-01-05

## 2022-03-18 PROBLEM — S43.431A SUPERIOR GLENOID LABRUM LESION OF RIGHT SHOULDER: Status: ACTIVE | Noted: 2020-01-05

## 2022-03-19 PROBLEM — S46.111A RUPTURE LONG HEAD BICEPS TENDON, RIGHT, INITIAL ENCOUNTER: Status: ACTIVE | Noted: 2020-01-10

## 2022-03-19 PROBLEM — S46.011A TRAUMATIC TEAR OF RIGHT ROTATOR CUFF: Status: ACTIVE | Noted: 2020-01-05

## 2022-03-19 PROBLEM — G56.21 CUBITAL TUNNEL SYNDROME ON RIGHT: Status: ACTIVE | Noted: 2020-04-27

## 2022-03-20 PROBLEM — S46.111A STRAIN OF MUSCLE, FASCIA AND TENDON OF LONG HEAD OF BICEPS, RIGHT ARM, INITIAL ENCOUNTER: Status: ACTIVE | Noted: 2020-01-05

## 2022-03-20 PROBLEM — M19.011 ARTHRITIS OF RIGHT SHOULDER REGION: Status: ACTIVE | Noted: 2020-01-10

## 2023-04-04 ENCOUNTER — HOSPITAL ENCOUNTER (EMERGENCY)
Age: 63
Discharge: HOME | End: 2023-04-04
Attending: EMERGENCY MEDICINE
Payer: COMMERCIAL

## 2023-04-04 ENCOUNTER — APPOINTMENT (OUTPATIENT)
Dept: GENERAL RADIOLOGY | Age: 63
End: 2023-04-04
Payer: COMMERCIAL

## 2023-04-04 VITALS
DIASTOLIC BLOOD PRESSURE: 96 MMHG | RESPIRATION RATE: 16 BRPM | BODY MASS INDEX: 25.92 KG/M2 | OXYGEN SATURATION: 96 % | TEMPERATURE: 98.2 F | HEART RATE: 62 BPM | SYSTOLIC BLOOD PRESSURE: 147 MMHG | WEIGHT: 175 LBS | HEIGHT: 69 IN

## 2023-04-04 DIAGNOSIS — R07.89 CHEST WALL PAIN: Primary | ICD-10-CM

## 2023-04-04 LAB
ALBUMIN SERPL-MCNC: 3.4 G/DL (ref 3.2–4.6)
ALBUMIN/GLOB SERPL: 0.9 (ref 0.4–1.6)
ALP SERPL-CCNC: 102 U/L (ref 50–136)
ALT SERPL-CCNC: 207 U/L (ref 12–65)
ANION GAP SERPL CALC-SCNC: 3 MMOL/L (ref 2–11)
AST SERPL-CCNC: 125 U/L (ref 15–37)
BILIRUB SERPL-MCNC: 0.8 MG/DL (ref 0.2–1.1)
BUN SERPL-MCNC: 14 MG/DL (ref 8–23)
CALCIUM SERPL-MCNC: 8.8 MG/DL (ref 8.3–10.4)
CHLORIDE SERPL-SCNC: 109 MMOL/L (ref 101–110)
CO2 SERPL-SCNC: 25 MMOL/L (ref 21–32)
CREAT SERPL-MCNC: 1.2 MG/DL (ref 0.8–1.5)
EKG ATRIAL RATE: 63 BPM
EKG DIAGNOSIS: NORMAL
EKG P AXIS: 15 DEGREES
EKG P-R INTERVAL: 182 MS
EKG Q-T INTERVAL: 406 MS
EKG QRS DURATION: 78 MS
EKG QTC CALCULATION (BAZETT): 415 MS
EKG R AXIS: -1 DEGREES
EKG T AXIS: -3 DEGREES
EKG VENTRICULAR RATE: 63 BPM
ERYTHROCYTE [DISTWIDTH] IN BLOOD BY AUTOMATED COUNT: 12.8 % (ref 11.9–14.6)
GLOBULIN SER CALC-MCNC: 4 G/DL (ref 2.8–4.5)
GLUCOSE SERPL-MCNC: 154 MG/DL (ref 65–100)
HCT VFR BLD AUTO: 44.4 % (ref 41.1–50.3)
HGB BLD-MCNC: 15.4 G/DL (ref 13.6–17.2)
MCH RBC QN AUTO: 31.9 PG (ref 26.1–32.9)
MCHC RBC AUTO-ENTMCNC: 34.7 G/DL (ref 31.4–35)
MCV RBC AUTO: 91.9 FL (ref 82–102)
NRBC # BLD: 0 K/UL (ref 0–0.2)
PLATELET # BLD AUTO: 167 K/UL (ref 150–450)
PMV BLD AUTO: 11.9 FL (ref 9.4–12.3)
POTASSIUM SERPL-SCNC: 3.6 MMOL/L (ref 3.5–5.1)
PROT SERPL-MCNC: 7.4 G/DL (ref 6.3–8.2)
RBC # BLD AUTO: 4.83 M/UL (ref 4.23–5.6)
SODIUM SERPL-SCNC: 137 MMOL/L (ref 133–143)
TROPONIN I SERPL HS-MCNC: 11.4 PG/ML (ref 0–57)
WBC # BLD AUTO: 9 K/UL (ref 4.3–11.1)

## 2023-04-04 PROCEDURE — 85027 COMPLETE CBC AUTOMATED: CPT

## 2023-04-04 PROCEDURE — 84484 ASSAY OF TROPONIN QUANT: CPT

## 2023-04-04 PROCEDURE — 94760 N-INVAS EAR/PLS OXIMETRY 1: CPT

## 2023-04-04 PROCEDURE — 71045 X-RAY EXAM CHEST 1 VIEW: CPT

## 2023-04-04 PROCEDURE — 80053 COMPREHEN METABOLIC PANEL: CPT

## 2023-04-04 PROCEDURE — 99285 EMERGENCY DEPT VISIT HI MDM: CPT

## 2023-04-04 PROCEDURE — 93005 ELECTROCARDIOGRAM TRACING: CPT | Performed by: EMERGENCY MEDICINE

## 2023-04-04 RX ORDER — MELOXICAM 15 MG/1
15 TABLET ORAL DAILY
Qty: 7 TABLET | Refills: 1 | Status: SHIPPED | OUTPATIENT
Start: 2023-04-04 | End: 2023-04-11

## 2023-04-04 ASSESSMENT — ENCOUNTER SYMPTOMS
RHINORRHEA: 0
DIARRHEA: 0
NAUSEA: 0
COLOR CHANGE: 0
ABDOMINAL PAIN: 0
BACK PAIN: 0
COUGH: 0
VOMITING: 0

## 2023-04-04 ASSESSMENT — HEART SCORE: ECG: 0

## 2023-04-04 ASSESSMENT — PAIN SCALES - GENERAL
PAINLEVEL_OUTOF10: 8
PAINLEVEL_OUTOF10: 8

## 2023-04-04 ASSESSMENT — PAIN - FUNCTIONAL ASSESSMENT: PAIN_FUNCTIONAL_ASSESSMENT: 0-10

## 2023-04-04 NOTE — ED NOTES
I have reviewed discharge instructions with the patient. The patient verbalized understanding. Patient left ED via Discharge Method: ambulatory to Home with self. Opportunity for questions and clarification provided. Patient given 1 scripts. To continue your aftercare when you leave the hospital, you may receive an automated call from our care team to check in on how you are doing. This is a free service and part of our promise to provide the best care and service to meet your aftercare needs.  If you have questions, or wish to unsubscribe from this service please call [phone]. Thank you for Choosing our [facility] Emergency [facility].         Sara Trevino RN  04/04/23 0157

## 2023-04-04 NOTE — DISCHARGE INSTRUCTIONS
Take prescribed Mobic once daily for 5-7 days or over-the-counter ibuprofen 800 mg 3 times daily for 5 to 7 days. Tylenol 500 to 650 mg every 6 hours if needed for breakthrough pain. Apply ice to the sore area for 15 minutes every 4 hours while awake for 4 to 5 days then change to heat 3 times a day for 15 minutes for several more days. Follow-up with your doctor the doctor provided in 10 to 14 days if not improving. Return if any new, worsening or concerning symptoms.

## 2023-04-04 NOTE — ED PROVIDER NOTES
4.6 g/dL    Globulin 4.0 2.8 - 4.5 g/dL    Albumin/Globulin Ratio 0.9 0.4 - 1.6     Troponin   Result Value Ref Range    Troponin, High Sensitivity 11.4 0 - 57 pg/mL   EKG 12 Lead   Result Value Ref Range    Ventricular Rate 63 BPM    Atrial Rate 63 BPM    P-R Interval 182 ms    QRS Duration 78 ms    Q-T Interval 406 ms    QTc Calculation (Bazett) 415 ms    P Axis 15 degrees    R Axis -1 degrees    T Axis -3 degrees    Diagnosis       Normal sinus rhythm  Moderate voltage criteria for LVH, may be normal variant ( R in aVL , Raphael   product )  Borderline ECG  No previous ECGs available  Confirmed by Rufus Archibald MD (), LYNN GUTIERREZ ([id number]) on 4/4/2023 5:19:16 PM          XR CHEST PORTABLE   Final Result      1. No acute finding          Thank you for the referral of this patient. This exam was interpreted by an    American Board of Radiology certified radiologist with subspecialty fellowship    in [place] [address]. If there are questions about this or other reports you can    contact a radiologist directly at [phone]   You can also reach me directly    at [email or web address]          David Rosales M.D., [facility]    4/4/2023 5:50:00 PM               Heart Score for chest pain patients  History: Slightly Suspicious  ECG: Normal  Patient Age: > 39 and < 65 years  *Risk factors for Atherosclerotic disease: Positive family History  Risk Factors: 1 or 2 risk factors  Troponin: < 1X normal limit  Heart Score Total: 2        Voice dictation software was used during the making of this note. This software is not perfect and grammatical and other typographical errors may be present. This note has not been completely proofread for errors.      Geoffrey Clancy MD  04/04/23 Kinnie Cranker

## (undated) DEVICE — ELECTRODE NDL 2.8IN COAT VALLEYLAB

## (undated) DEVICE — BANDAGE COBAN 4 IN COMPR W4INXL5YD FOAM COHESIVE QUIK STK SELF ADH SFT

## (undated) DEVICE — INTENDED TO BE USED TO OCCLUDE, RETRACT AND IDENTIFY ARTERIES, VEINS, TENDONS AND NERVES IN SURGICAL PROCEDURES: Brand: STERION®  VESSEL LOOP

## (undated) DEVICE — SUTURE VCRL SZ 0 L27IN ABSRB UD L36MM CP-1 1/2 CIR REV CUT J267H

## (undated) DEVICE — SHEET, DRAPE, SPLIT, STERILE: Brand: MEDLINE

## (undated) DEVICE — GUIDEPIN ORTH L300MM DIA1.5MM GLENOSPHERE FOR DELT XTEND

## (undated) DEVICE — TOTAL SHOULDER DR POSTA: Brand: MEDLINE INDUSTRIES, INC.

## (undated) DEVICE — 4.0MM ROUND FAST CUTTING BUR

## (undated) DEVICE — Z DISCONTINUED PER MEDLINE USE 2741944 DRESSING AQUACEL 12 IN SURG W9XL30CM SIL CVR WTRPRF VIR BACT BARR ANTIMIC

## (undated) DEVICE — COVER,TABLE,HEAVY DUTY,79"X110",STRL: Brand: MEDLINE

## (undated) DEVICE — ARGYLE SIGMOID SURGICAL SUCTION INSTRUMENT 23 FR/CH (7.7 MM): Brand: ARGYLE

## (undated) DEVICE — PACKING WND W1INXL6FT VAG WVN COT GZ RADPQ

## (undated) DEVICE — COTTON UNDERCAST PADDING,REGULAR FINISH: Brand: WEBRIL

## (undated) DEVICE — SUTURE ETHBND EXCEL SZ 5 L30IN NONABSORBABLE GRN L40MM V-37 MB66G

## (undated) DEVICE — GUIDEPIN ORTH L190MM DIA2.5MM METAGLENE CTRL FOR DELT XTEND

## (undated) DEVICE — STERILE HOOK LOCK LATEX FREE ELASTIC BANDAGE 4INX5YD: Brand: HOOK LOCK™

## (undated) DEVICE — Device

## (undated) DEVICE — HANDPIECE SET WITH COAXIAL HIGH FLOW TIP AND SUCTION TUBE: Brand: INTERPULSE

## (undated) DEVICE — INTEGUSEAL MICROBIAL SEALANT: Brand: AVANOS

## (undated) DEVICE — SCREW BNE L30MM DIA4.5MM PROX CORT TIB S STL ST LOK FULL
Type: IMPLANTABLE DEVICE | Site: SHOULDER | Status: NON-FUNCTIONAL
Removed: 2020-01-10

## (undated) DEVICE — GOWN,REINFORCED,POLY,SIRUS,XLNG/XXLG: Brand: MEDLINE

## (undated) DEVICE — SURGICAL PROCEDURE PACK BASIC ST FRANCIS

## (undated) DEVICE — SOLUTION IRRIG 3000ML 0.9% SOD CHL FLX CONT 0797208] ICU MEDICAL INC]

## (undated) DEVICE — BUTTON SWITCH PENCIL BLADE ELECTRODE, HOLSTER: Brand: EDGE

## (undated) DEVICE — REM POLYHESIVE ADULT PATIENT RETURN ELECTRODE: Brand: VALLEYLAB

## (undated) DEVICE — SUTURE PROL SZ 2-0 L18IN NONABSORBABLE BLU FS L26MM 3/8 CIR 8685H

## (undated) DEVICE — (D)PREP SKN CHLRAPRP APPL 26ML -- CONVERT TO ITEM 371833

## (undated) DEVICE — OCCLUSIVE GAUZE STRIP,3% BISMUTH TRIBROMOPHENATE IN PETROLATUM BLEND: Brand: XEROFORM

## (undated) DEVICE — BNDG,ELSTC,MATRIX,STRL,4"X5YD,LF,HOOK&LP: Brand: MEDLINE

## (undated) DEVICE — DISPOSABLE TOURNIQUET CUFF SINGLE BLADDER, DUAL PORT AND LUER LOCK CONNECTOR: Brand: COLOR CUFF

## (undated) DEVICE — BNDG,ELSTC,MATRIX,STRL,6"X5YD,LF,HOOK&LP: Brand: MEDLINE

## (undated) DEVICE — SUTURE ETHBND EXCEL SZ 2 L27IN NONABSORBABLE GRN WHT MO-7 D7485

## (undated) DEVICE — DRAPE,TOP,102X53,STERILE: Brand: MEDLINE

## (undated) DEVICE — PAD,ABDOMINAL,5"X9",ST,LF,25/BX: Brand: MEDLINE INDUSTRIES, INC.

## (undated) DEVICE — GARMENT,MEDLINE,DVT,INT,CALF,MED, GEN2: Brand: MEDLINE

## (undated) DEVICE — STERILE SLEEVE: Brand: CONVERTORS

## (undated) DEVICE — OSCILLATING TIP SAW CARTRIDGE: Brand: STRYKER PRECISION

## (undated) DEVICE — SUTURE ETHLN SZ 2-0 L18IN NONABSORBABLE BLK L26MM PS 3/8 585H

## (undated) DEVICE — YANKAUER,BULB TIP,W/O VENT,RIGID,STERILE: Brand: MEDLINE

## (undated) DEVICE — DRAPE,U/SHT,SPLIT,FILM,60X84,STERILE: Brand: MEDLINE